# Patient Record
Sex: MALE | Race: WHITE | NOT HISPANIC OR LATINO | Employment: FULL TIME | URBAN - METROPOLITAN AREA
[De-identification: names, ages, dates, MRNs, and addresses within clinical notes are randomized per-mention and may not be internally consistent; named-entity substitution may affect disease eponyms.]

---

## 2017-04-10 ENCOUNTER — OFFICE VISIT (OUTPATIENT)
Dept: FAMILY MEDICINE | Facility: CLINIC | Age: 42
End: 2017-04-10
Payer: COMMERCIAL

## 2017-04-10 VITALS
SYSTOLIC BLOOD PRESSURE: 134 MMHG | DIASTOLIC BLOOD PRESSURE: 78 MMHG | HEIGHT: 74 IN | BODY MASS INDEX: 32.85 KG/M2 | TEMPERATURE: 97.4 F | OXYGEN SATURATION: 99 % | HEART RATE: 75 BPM | WEIGHT: 256 LBS

## 2017-04-10 DIAGNOSIS — R36.1 HEMATOSPERMIA: ICD-10-CM

## 2017-04-10 DIAGNOSIS — Z12.5 SCREENING FOR PROSTATE CANCER: ICD-10-CM

## 2017-04-10 DIAGNOSIS — Z00.00 HEALTH CARE MAINTENANCE: Primary | ICD-10-CM

## 2017-04-10 PROCEDURE — 99396 PREV VISIT EST AGE 40-64: CPT | Performed by: FAMILY MEDICINE

## 2017-04-10 NOTE — NURSING NOTE
"Chief Complaint   Patient presents with     Physical     non fasting       Initial /90 (BP Location: Right arm, Patient Position: Chair, Cuff Size: Adult Regular)  Pulse 75  Temp 97.4  F (36.3  C) (Tympanic)  Ht 6' 1.5\" (1.867 m)  Wt 256 lb (116.1 kg)  SpO2 99%  BMI 33.32 kg/m2 Estimated body mass index is 33.32 kg/(m^2) as calculated from the following:    Height as of this encounter: 6' 1.5\" (1.867 m).    Weight as of this encounter: 256 lb (116.1 kg).  Medication Reconciliation: complete  "

## 2017-04-10 NOTE — PROGRESS NOTES
SUBJECTIVE:     CC: Rocael Borden is an 42 year old male who presents for preventative health visit.     Healthy Habits:    Do you get at least three servings of calcium containing foods daily (dairy, green leafy vegetables, etc.)? yes    Amount of exercise or daily activities, outside of work: 2-3 day(s) per week    Problems taking medications regularly not applicable    Medication side effects: No    Have you had an eye exam in the past two years? no    Do you see a dentist twice per year? no    Do you have sleep apnea, excessive snoring or daytime drowsiness?no        PROBLEMS TO ADD ON...na     Today's PHQ-2 Score:   PHQ-2 ( 1999 Pf[employer]) 8/23/2016 1/11/2016   Q1: Little interest or pleasure in doing things 0 0   Q2: Feeling down, depressed or hopeless 0 0   PHQ-2 Score 0 0   Little interest or pleasure in doing things - -   Feeling down, depressed or hopeless - -   PHQ-2 Score - -       Abuse: Current or Past(Physical, Sexual or Emotional)- NO  Do you feel safe in your environment - YES    Social History   Substance Use Topics     Smoking status: Never Smoker     Smokeless tobacco: Current User     Types: Chew     Alcohol use 0.0 oz/week     0 Standard drinks or equivalent per week      Comment: 3 drinks twice per week     The patient does not drink >3 drinks per day nor >7 drinks per week.    Last PSA: No results found for: PSA    Recent Labs   Lab Test  08/23/16   1538  08/21/15   1606  03/14/12   1104   CHOL  209*  189  224*   HDL  54  48  57   LDL  125*  107  147*   TRIG  152*  168*  99   CHOLHDLRATIO   --   3.9  3.9   NHDL  155*   --    --        Reviewed orders with patient. Reviewed health maintenance and updated orders accordingly - Yes    Reviewed and updated as needed this visit by clinical staff         Reviewed and updated as needed this visit by Provider        Past Medical History:   Diagnosis Date     Erectile dysfunction, unspecified erectile dysfunction type 1/11/2016     NO ACTIVE PROBLEMS        Past Surgical History:   Procedure Laterality Date     NO HISTORY OF SURGERY         ROS:  C: NEGATIVE for fever, chills, change in weight  I: NEGATIVE for worrisome rashes, moles or lesions  E: NEGATIVE for vision changes or irritation  ENT: NEGATIVE for ear, mouth and throat problems  R: NEGATIVE for significant cough or SOB  CV: NEGATIVE for chest pain, palpitations or peripheral edema  GI: NEGATIVE for nausea, abdominal pain, heartburn, or change in bowel habits   male: negative for dysuria, hematuria, decreased urinary stream, erectile dysfunction, urethral discharge  M: NEGATIVE for significant arthralgias or myalgia  N: NEGATIVE for weakness, dizziness or paresthesias  P: NEGATIVE for changes in mood or affect    BP Readings from Last 3 Encounters:   04/10/17 134/78   08/23/16 122/80   01/11/16 112/78    Wt Readings from Last 3 Encounters:   04/10/17 256 lb (116.1 kg)   08/23/16 248 lb (112.5 kg)   01/11/16 249 lb (112.9 kg)                  Patient Active Problem List   Diagnosis     Skin Tags     Hyperlipidemia LDL goal <160     Obesity (BMI 30-39.9)     Lentigo     Knee pain     Erectile dysfunction, unspecified erectile dysfunction type     Past Surgical History:   Procedure Laterality Date     NO HISTORY OF SURGERY         Social History   Substance Use Topics     Smoking status: Never Smoker     Smokeless tobacco: Current User     Types: Chew     Alcohol use 0.0 oz/week     0 Standard drinks or equivalent per week      Comment: 3 drinks twice per week     Family History   Problem Relation Age of Onset     DIABETES Maternal Grandmother      C.A.D. No family hx of      Prostate Cancer No family hx of      Cancer - colorectal No family hx of          Current Outpatient Prescriptions   Medication Sig Dispense Refill     tadalafil (CIALIS) 5 MG tablet Take 1 tablet (5 mg) by mouth daily Never use with nitroglycerin, terazosin or doxazosin. (Patient not taking: Reported on 4/10/2017) 30 tablet 2  "    Allergies   Allergen Reactions     Penicillins Swelling     Recent Labs   Lab Test  08/23/16   1538  08/21/15   1606  03/14/12   1104  06/25/10   0836   LDL  125*  107  147*  122   HDL  54  48  57  51   TRIG  152*  168*  99  154*   ALT   --   55   --   42   CR  0.82  0.86   --   0.91   GFRESTIMATED  >90  Non  GFR Calc    >90  Non  GFR Calc     --   >90   GFRESTBLACK  >90   GFR Calc    >90   GFR Calc     --   >90   POTASSIUM  4.0  3.9   --   4.3   TSH   --    --   1.99   --       OBJECTIVE:     /78 (BP Location: Right arm, Patient Position: Chair, Cuff Size: Adult Regular)  Pulse 75  Temp 97.4  F (36.3  C) (Tympanic)  Ht 6' 1.5\" (1.867 m)  Wt 256 lb (116.1 kg)  SpO2 99%  BMI 33.32 kg/m2  EXAM:  GENERAL: healthy, alert and no distress  EYES: Eyes grossly normal to inspection, PERRL and conjunctivae and sclerae normal  HENT: ear canals and TM's normal, nose and mouth without ulcers or lesions  NECK: no adenopathy, no asymmetry, masses, or scars and thyroid normal to palpation  RESP: lungs clear to auscultation - no rales, rhonchi or wheezes  CV: regular rate and rhythm, normal S1 S2, no S3 or S4, no murmur, click or rub, no peripheral edema and peripheral pulses strong  ABDOMEN: soft, nontender, no hepatosplenomegaly, no masses and bowel sounds normal   (male): normal male genitalia without lesions or urethral discharge, no hernia  MS: no gross musculoskeletal defects noted, no edema  SKIN: no suspicious lesions or rashes  NEURO: Normal strength and tone, mentation intact and speech normal  BACK: no CVA tenderness, no paralumbar tenderness  PSYCH: mentation appears normal, affect normal/bright  LYMPH: no cervical, supraclavicular, axillary, or inguinal adenopathy    ASSESSMENT/PLAN:     1. Health care maintenance    - Hemoglobin; Future  - Basic metabolic panel  (Ca, Cl, CO2, Creat, Gluc, K, Na, BUN); Future  - ALT; Future  - Lipid " "Profile with reflex to direct LDL; Future  - PSA, screen; Future  - UA with Microscopic reflex to Culture; Future    2. Hematospermia  Has no other clinical sign, will have him to do lab and referral to urology for further evaluation   - PSA, screen; Future  - UA with Microscopic reflex to Culture; Future  - UROLOGY ADULT REFERRAL    3. Screening for prostate cancer    - PSA, screen; Future  - UA with Microscopic reflex to Culture; Future  - UROLOGY ADULT REFERRAL    COUNSELING:  Reviewed preventive health counseling, as reflected in patient instructions         reports that he has never smoked. His smokeless tobacco use includes Chew.    Estimated body mass index is 32.28 kg/(m^2) as calculated from the following:    Height as of 8/23/16: 6' 1.5\" (1.867 m).    Weight as of 8/23/16: 248 lb (112.5 kg).       Counseling Resources:  ATP IV Guidelines  Pooled Cohorts Equation Calculator  FRAX Risk Assessment  ICSI Preventive Guidelines  Dietary Guidelines for Americans, 2010  USDA's MyPlate  ASA Prophylaxis  Lung CA Screening    Aaron Lynne MD  [facility]  "

## 2017-04-10 NOTE — MR AVS SNAPSHOT
After Visit Summary   4/10/2017    Rocael Borden    MRN: [id number]           Patient Information     Date Of Birth          1975        Visit Information        Provider Department      4/10/2017 2:20 PM Aaron Lynne MD [facility]        Today's Diagnoses     Health care maintenance    -  1    Hematospermia        Screening for prostate cancer          Care Instructions      Preventive Health Recommendations  Male Ages 40 to 49    Yearly exam:             See your health care provider every year in order to  o   Review health changes.   o   Discuss preventive care.    o   Review your medicines if your doctor has prescribed any.    You should be tested each year for STDs (sexually transmitted diseases) if you re at risk.     Have a cholesterol test every 5 years.     Have a colonoscopy (test for colon cancer) if someone in your family has had colon cancer or polyps before age 50.     After age 45, have a diabetes test (fasting glucose). If you are at risk for diabetes, you should have this test every 3 years.      Talk with your health care provider about whether or not a prostate cancer screening test (PSA) is right for you.    Shots: Get a flu shot each year. Get a tetanus shot every 10 years.     Nutrition:    Eat at least 5 servings of fruits and vegetables daily.     Eat whole-grain bread, whole-wheat pasta and brown rice instead of white grains and rice.     Talk to your provider about Calcium and Vitamin D.     Lifestyle    Exercise for at least 150 minutes a week (30 minutes a day, 5 days a week). This will help you control your weight and prevent disease.     Limit alcohol to one drink per day.     No smoking.     Wear sunscreen to prevent skin cancer.     See your dentist every six months for an exam and cleaning.      Preventive Health Recommendations  Male Ages 40 to 49    Yearly exam:             See your health care provider every year in order to  o   Review health  changes.   o   Discuss preventive care.    o   Review your medicines if your doctor has prescribed any.    You should be tested each year for STDs (sexually transmitted diseases) if you re at risk.     Have a cholesterol test every 5 years.     Have a colonoscopy (test for colon cancer) if someone in your family has had colon cancer or polyps before age 50.     After age 45, have a diabetes test (fasting glucose). If you are at risk for diabetes, you should have this test every 3 years.      Talk with your health care provider about whether or not a prostate cancer screening test (PSA) is right for you.    Shots: Get a flu shot each year. Get a tetanus shot every 10 years.     Nutrition:    Eat at least 5 servings of fruits and vegetables daily.     Eat whole-grain bread, whole-wheat pasta and brown rice instead of white grains and rice.     Talk to your provider about Calcium and Vitamin D.     Lifestyle    Exercise for at least 150 minutes a week (30 minutes a day, 5 days a week). This will help you control your weight and prevent disease.     Limit alcohol to one drink per day.     No smoking.     Wear sunscreen to prevent skin cancer.     See your dentist every six months for an exam and cleaning.            Follow-ups after your visit        Additional Services     UROLOGY ADULT REFERRAL       Your provider has referred you to: [facility]: [facility] [phone]   [email or web address]    Please be aware that coverage of these services is subject to the terms and limitations of your health insurance plan.  Call member services at your health plan with any benefit or coverage questions.      Please bring the following with you to your appointment:    (1) Any X-Rays, CTs or MRIs which have been performed.  Contact the facility where they were done to arrange for  prior to your scheduled appointment.    (2) List of current medications  (3) This referral request   (4) Any  documents/labs given to you for this referral                  Your next 10 appointments already scheduled     Apr 11, 2017  8:00 AM CDT   LAB with EC LAB   [facility] [place] [facility]    [address] [place], [place] [address] [place] [address]   [phone]           OUTSIDE LABS:  Please include name of facility and Physician that is requesting outside labs be drawn.  Please indicate if labs are fasting or non-fasting on appt notes.  Be as specific as you can on which labs are being drawn.              Future tests that were ordered for you today     Open Future Orders        Priority Expected Expires Ordered    Hemoglobin Routine  4/10/2018 4/10/2017    Basic metabolic panel  (Ca, Cl, CO2, Creat, Gluc, K, Na, BUN) Routine  4/10/2018 4/10/2017    ALT Routine  4/10/2018 4/10/2017    Lipid Profile with reflex to direct LDL Routine  4/10/2018 4/10/2017    PSA, screen Routine  4/10/2018 4/10/2017    UA with Microscopic reflex to Culture Routine  4/10/2018 4/10/2017            Who to contact     If you have questions or need follow up information about today's clinic visit or your schedule please contact [facility] directly at [phone].  Normal or non-critical lab and imaging results will be communicated to you by GreenLighthart, letter or phone within 4 business days after the clinic has received the results. If you do not hear from us within 7 days, please contact the clinic through GreenLighthart or phone. If you have a critical or abnormal lab result, we will notify you by phone as soon as possible.  Submit refill requests through Modabound or call your pharmacy and they will forward the refill request to us. Please allow 3 business days for your refill to be completed.          Additional Information About Your Visit        Modabound Information     Modabound gives you secure access to your electronic health record. If you see a primary care provider, you can also send messages to  "your care team and make appointments. If you have questions, please call your primary care clinic.  If you do not have a primary care provider, please call [phone] and they will assist you.        Care EveryWhere ID     This is your Care EveryWhere ID. This could be used by other organizations to access your [place] medical records  [id number]        Your Vitals Were     Pulse Temperature Height Pulse Oximetry BMI (Body Mass Index)       75 97.4  F (36.3  C) (Tympanic) 6' 1.5\" (1.867 m) 99% 33.32 kg/m2        Blood Pressure from Last 3 Encounters:   04/10/17 134/78   08/23/16 122/80   01/11/16 112/78    Weight from Last 3 Encounters:   04/10/17 256 lb (116.1 kg)   08/23/16 248 lb (112.5 kg)   01/11/16 249 lb (112.9 kg)              We Performed the Following     UROLOGY ADULT REFERRAL        Primary Care Provider Office Phone # Fax #    Aaron Lynne [phone]       [address] [address]        Thank you!     Thank you for choosing [facility]  for your care. Our goal is always to provide you with excellent care. Hearing back from our patients is one way we can continue to improve our services. Please take a few minutes to complete the written survey that you may receive in the mail after your visit with us. Thank you!             Your Updated Medication List - Protect others around you: Learn how to safely use, store and throw away your medicines at www.disposemymeds.org.          This list is accurate as of: 4/10/17  2:40 PM.  Always use your most recent med list.                   Brand Name Dispense Instructions for use    tadalafil 5 MG tablet    CIALIS    30 tablet    Take 1 tablet (5 mg) by mouth daily Never use with nitroglycerin, terazosin or doxazosin.         "

## 2017-04-11 DIAGNOSIS — Z12.5 SCREENING FOR PROSTATE CANCER: ICD-10-CM

## 2017-04-11 DIAGNOSIS — Z00.00 HEALTH CARE MAINTENANCE: ICD-10-CM

## 2017-04-11 DIAGNOSIS — R36.1 HEMATOSPERMIA: ICD-10-CM

## 2017-04-11 LAB
ALBUMIN UR-MCNC: NEGATIVE MG/DL
ALT SERPL W P-5'-P-CCNC: 95 U/L (ref 0–70)
AMORPH CRY #/AREA URNS HPF: ABNORMAL /HPF
ANION GAP SERPL CALCULATED.3IONS-SCNC: 4 MMOL/L (ref 3–14)
APPEARANCE UR: CLEAR
BACTERIA #/AREA URNS HPF: ABNORMAL /HPF
BILIRUB UR QL STRIP: NEGATIVE
BUN SERPL-MCNC: 17 MG/DL (ref 7–30)
CALCIUM SERPL-MCNC: 9.5 MG/DL (ref 8.5–10.1)
CHLORIDE SERPL-SCNC: 106 MMOL/L (ref 94–109)
CHOLEST SERPL-MCNC: 213 MG/DL
CO2 SERPL-SCNC: 30 MMOL/L (ref 20–32)
COLOR UR AUTO: YELLOW
CREAT SERPL-MCNC: 0.85 MG/DL (ref 0.66–1.25)
GFR SERPL CREATININE-BSD FRML MDRD: ABNORMAL ML/MIN/1.7M2
GLUCOSE SERPL-MCNC: 105 MG/DL (ref 70–99)
GLUCOSE UR STRIP-MCNC: NEGATIVE MG/DL
HDLC SERPL-MCNC: 52 MG/DL
HGB BLD-MCNC: 15.5 G/DL (ref 13.3–17.7)
HGB UR QL STRIP: ABNORMAL
KETONES UR STRIP-MCNC: NEGATIVE MG/DL
LDLC SERPL CALC-MCNC: 117 MG/DL
LEUKOCYTE ESTERASE UR QL STRIP: NEGATIVE
MUCOUS THREADS #/AREA URNS LPF: PRESENT /LPF
NITRATE UR QL: NEGATIVE
NON-SQ EPI CELLS #/AREA URNS LPF: ABNORMAL /LPF
NONHDLC SERPL-MCNC: 161 MG/DL
PH UR STRIP: 5.5 PH (ref 5–7)
POTASSIUM SERPL-SCNC: 4.4 MMOL/L (ref 3.4–5.3)
PSA SERPL-ACNC: 1.42 UG/L (ref 0–4)
RBC #/AREA URNS AUTO: ABNORMAL /HPF (ref 0–2)
SODIUM SERPL-SCNC: 140 MMOL/L (ref 133–144)
SP GR UR STRIP: 1.02 (ref 1–1.03)
TRIGL SERPL-MCNC: 221 MG/DL
URN SPEC COLLECT METH UR: ABNORMAL
UROBILINOGEN UR STRIP-ACNC: 0.2 EU/DL (ref 0.2–1)
WBC #/AREA URNS AUTO: ABNORMAL /HPF (ref 0–2)

## 2017-04-11 PROCEDURE — 36415 COLL VENOUS BLD VENIPUNCTURE: CPT | Performed by: FAMILY MEDICINE

## 2017-04-11 PROCEDURE — 85018 HEMOGLOBIN: CPT | Performed by: FAMILY MEDICINE

## 2017-04-11 PROCEDURE — 80048 BASIC METABOLIC PNL TOTAL CA: CPT | Performed by: FAMILY MEDICINE

## 2017-04-11 PROCEDURE — 80061 LIPID PANEL: CPT | Performed by: FAMILY MEDICINE

## 2017-04-11 PROCEDURE — G0103 PSA SCREENING: HCPCS | Performed by: FAMILY MEDICINE

## 2017-04-11 PROCEDURE — 84460 ALANINE AMINO (ALT) (SGPT): CPT | Performed by: FAMILY MEDICINE

## 2017-04-11 PROCEDURE — 81001 URINALYSIS AUTO W/SCOPE: CPT | Performed by: FAMILY MEDICINE

## 2018-04-25 ENCOUNTER — OFFICE VISIT (OUTPATIENT)
Dept: FAMILY MEDICINE | Facility: CLINIC | Age: 43
End: 2018-04-25
Payer: COMMERCIAL

## 2018-04-25 VITALS
TEMPERATURE: 97.3 F | HEART RATE: 77 BPM | HEIGHT: 74 IN | WEIGHT: 255 LBS | OXYGEN SATURATION: 98 % | BODY MASS INDEX: 32.73 KG/M2 | RESPIRATION RATE: 16 BRPM | DIASTOLIC BLOOD PRESSURE: 82 MMHG | SYSTOLIC BLOOD PRESSURE: 121 MMHG

## 2018-04-25 DIAGNOSIS — Z00.00 HEALTH CARE MAINTENANCE: Primary | ICD-10-CM

## 2018-04-25 DIAGNOSIS — Z11.3 SCREEN FOR STD (SEXUALLY TRANSMITTED DISEASE): ICD-10-CM

## 2018-04-25 LAB
ERYTHROCYTE [DISTWIDTH] IN BLOOD BY AUTOMATED COUNT: 13.4 % (ref 10–15)
HCT VFR BLD AUTO: 45.3 % (ref 40–53)
HGB BLD-MCNC: 14.3 G/DL (ref 13.3–17.7)
MCH RBC QN AUTO: 30.1 PG (ref 26.5–33)
MCHC RBC AUTO-ENTMCNC: 31.6 G/DL (ref 31.5–36.5)
MCV RBC AUTO: 95 FL (ref 78–100)
PLATELET # BLD AUTO: 159 10E9/L (ref 150–450)
RBC # BLD AUTO: 4.75 10E12/L (ref 4.4–5.9)
WBC # BLD AUTO: 4 10E9/L (ref 4–11)

## 2018-04-25 PROCEDURE — 80053 COMPREHEN METABOLIC PANEL: CPT | Performed by: FAMILY MEDICINE

## 2018-04-25 PROCEDURE — 87491 CHLMYD TRACH DNA AMP PROBE: CPT | Performed by: FAMILY MEDICINE

## 2018-04-25 PROCEDURE — 99396 PREV VISIT EST AGE 40-64: CPT | Performed by: FAMILY MEDICINE

## 2018-04-25 PROCEDURE — 85027 COMPLETE CBC AUTOMATED: CPT | Performed by: FAMILY MEDICINE

## 2018-04-25 PROCEDURE — 36415 COLL VENOUS BLD VENIPUNCTURE: CPT | Performed by: FAMILY MEDICINE

## 2018-04-25 PROCEDURE — 80061 LIPID PANEL: CPT | Performed by: FAMILY MEDICINE

## 2018-04-25 PROCEDURE — 87591 N.GONORRHOEAE DNA AMP PROB: CPT | Performed by: FAMILY MEDICINE

## 2018-04-25 NOTE — MR AVS SNAPSHOT
After Visit Summary   4/25/2018    Rocael Borden    MRN: [id number]           Patient Information     Date Of Birth          1975        Visit Information        Provider Department      4/25/2018 12:40 PM Aaron Lynne MD [facility] [place]        Today's Diagnoses     Health care maintenance    -  1    Screen for STD (sexually transmitted disease)          Care Instructions      Preventive Health Recommendations  Male Ages 40 to 49    Yearly exam:             See your health care provider every year in order to  o   Review health changes.   o   Discuss preventive care.    o   Review your medicines if your doctor has prescribed any.    You should be tested each year for STDs (sexually transmitted diseases) if you re at risk.     Have a cholesterol test every 5 years.     Have a colonoscopy (test for colon cancer) if someone in your family has had colon cancer or polyps before age 50.     After age 45, have a diabetes test (fasting glucose). If you are at risk for diabetes, you should have this test every 3 years.      Talk with your health care provider about whether or not a prostate cancer screening test (PSA) is right for you.    Shots: Get a flu shot each year. Get a tetanus shot every 10 years.     Nutrition:    Eat at least 5 servings of fruits and vegetables daily.     Eat whole-grain bread, whole-wheat pasta and brown rice instead of white grains and rice.     Talk to your provider about Calcium and Vitamin D.     Lifestyle    Exercise for at least 150 minutes a week (30 minutes a day, 5 days a week). This will help you control your weight and prevent disease.     Limit alcohol to one drink per day.     No smoking.     Wear sunscreen to prevent skin cancer.     See your dentist every six months for an exam and cleaning.              Follow-ups after your visit        Follow-up notes from your care team     Return in about 1 year (around 4/25/2019) for Physical Exam.      Who to  "contact     If you have questions or need follow up information about today's clinic visit or your schedule please contact [facility] directly at [phone].  Normal or non-critical lab and imaging results will be communicated to you by MyChart, letter or phone within 4 business days after the clinic has received the results. If you do not hear from us within 7 days, please contact the clinic through MyChart or phone. If you have a critical or abnormal lab result, we will notify you by phone as soon as possible.  Submit refill requests through Dr. Z or call your pharmacy and they will forward the refill request to us. Please allow 3 business days for your refill to be completed.          Additional Information About Your Visit        ScrollMotionharGoodData Information     Dr. Z gives you secure access to your electronic health record. If you see a primary care provider, you can also send messages to your care team and make appointments. If you have questions, please call your primary care clinic.  If you do not have a primary care provider, please call [phone] and they will assist you.        Care EveryWhere ID     This is your Care EveryWhere ID. This could be used by other organizations to access your [place] medical records  [id number]        Your Vitals Were     Pulse Temperature Respirations Height Pulse Oximetry BMI (Body Mass Index)    77 97.3  F (36.3  C) (Tympanic) 16 6' 1.75\" (1.873 m) 98% 32.96 kg/m2       Blood Pressure from Last 3 Encounters:   04/25/18 121/82   04/10/17 134/78   08/23/16 122/80    Weight from Last 3 Encounters:   04/25/18 255 lb (115.7 kg)   04/10/17 256 lb (116.1 kg)   08/23/16 248 lb (112.5 kg)              We Performed the Following     CBC with platelets     Chlamydia trachomatis PCR     Comprehensive metabolic panel     Lipid panel reflex to direct LDL Fasting     Neisseria gonorrhoeae PCR        Primary Care Provider Office Phone # Fax #    Aaron Lynne MD " [phone]       [address] [facility] [address]        Equal Access to Services     ARAM KAUR : Ozzie Meyer, vasupete turpin, florenciajose cuevasmarinoda evgeny, debbie perryjose alejandro morgan castillo laPaulinaeric brito. So [facility] [phone].    ATENCIÓN: Si habla español, tiene a gray disposición servicios gratuitos de asistencia lingüística. Llame al [phone].    We comply with applicable federal civil rights laws and [place] laws. We do not discriminate on the basis of race, color, national origin, age, disability, sex, sexual orientation, or gender identity.            Thank you!     Thank you for choosing [facility]  [place] your [place]. Our goal is always to provide you with excellent care. Hearing back from our patients is one way we can continue to improve our services. Please take a few minutes to complete the written survey that you may receive in the mail after your visit with us. Thank you!             Your Updated Medication List - Protect others around you: Learn how to safely use, store and throw away your medicines at www.disposemymeds.org.      Notice  As of 4/25/2018  1:33 PM    You have not been prescribed any medications.

## 2018-04-25 NOTE — PROGRESS NOTES
SUBJECTIVE:   CC: Rocael Borden is an 43 year old male who presents for preventative health visit.     Healthy Habits:    Do you get at least three servings of calcium containing foods daily (dairy, green leafy vegetables, etc.)? yes    Amount of exercise or daily activities, outside of work: 0    Problems taking medications regularly No    Medication side effects: No    Have you had an eye exam in the past two years? no    Do you see a dentist twice per year? yes    Do you have sleep apnea, excessive snoring or daytime drowsiness?no     Today's PHQ-2 Score:   PHQ-2 ( 1999 [employer]) 8/23/2016 1/11/2016   Q1: Little interest or pleasure in doing things 0 0   Q2: Feeling down, depressed or hopeless 0 0   PHQ-2 Score 0 0   Q1: Little interest or pleasure in doing things - -   Q2: Feeling down, depressed or hopeless - -   PHQ-2 Score - -       Abuse: Current or Past(Physical, Sexual or Emotional)- No  Do you feel safe in your environment - Yes    Social History   Substance Use Topics     Smoking status: Never Smoker     Smokeless tobacco: Current User     Types: Chew     Alcohol use 0.0 oz/week     0 Standard drinks or equivalent per week      Comment: 3 drinks twice per week      If you drink alcohol do you typically have >3 drinks per day or >7 drinks per week? No                      Last PSA:   PSA   Date Value Ref Range Status   04/11/2017 1.42 0 - 4 ug/L Final     Comment:     Assay Method:  Chemiluminescence using Siemens Vista analyzer       Reviewed orders with patient. Reviewed health maintenance and updated orders accordingly - Yes  BP Readings from Last 3 Encounters:   04/25/18 121/82   04/10/17 134/78   08/23/16 122/80    Wt Readings from Last 3 Encounters:   04/25/18 255 lb (115.7 kg)   04/10/17 256 lb (116.1 kg)   08/23/16 248 lb (112.5 kg)                  Patient Active Problem List   Diagnosis     Skin Tags     Hyperlipidemia LDL goal <160     Obesity (BMI 30-39.9)     Lentigo     Knee pain      Erectile dysfunction, unspecified erectile dysfunction type     Past Surgical History:   Procedure Laterality Date     NO HISTORY OF SURGERY         Social History   Substance Use Topics     Smoking status: Never Smoker     Smokeless tobacco: Current User     Types: Chew     Alcohol use 0.0 oz/week     0 Standard drinks or equivalent per week      Comment: 3 drinks twice per week     Family History   Problem Relation Age of Onset     DIABETES Maternal Grandmother      C.A.D. No family hx of      Prostate Cancer No family hx of      Cancer - colorectal No family hx of          No current outpatient prescriptions on file.     Allergies   Allergen Reactions     Penicillins Swelling     Recent Labs   Lab Test  04/11/17   0816  08/23/16   1538  08/21/15   1606  03/14/12   1104  06/25/10   0836   LDL  117*  125*  107  147*  122   HDL  52  54  48  57  51   TRIG  221*  152*  168*  99  154*   ALT  95*   --   55   --   42   CR  0.85  0.82  0.86   --   0.91   GFRESTIMATED  >90  Non  GFR Calc    >90  Non  GFR Calc    >90  Non  GFR Calc     --   >90   GFRESTBLACK  >90   GFR Calc    >90   GFR Calc    >90   GFR Calc     --   >90   POTASSIUM  4.4  4.0  3.9   --   4.3   TSH   --    --    --   1.99   --         Reviewed and updated as needed this visit by clinical staff         Reviewed and updated as needed this visit by Provider        Past Medical History:   Diagnosis Date     Erectile dysfunction, unspecified erectile dysfunction type 1/11/2016     NO ACTIVE PROBLEMS       Past Surgical History:   Procedure Laterality Date     NO HISTORY OF SURGERY         ROS:  C: NEGATIVE for fever, chills, change in weight  I: NEGATIVE for worrisome rashes, moles or lesions  E: NEGATIVE for vision changes or irritation  ENT: NEGATIVE for ear, mouth and throat problems  R: NEGATIVE for significant cough or SOB  CV: NEGATIVE for chest pain,  "palpitations or peripheral edema  GI: NEGATIVE for nausea, abdominal pain, heartburn, or change in bowel habits   male: negative for dysuria, hematuria, decreased urinary stream, erectile dysfunction, urethral discharge  M: NEGATIVE for significant arthralgias or myalgia  N: NEGATIVE for weakness, dizziness or paresthesias  P: NEGATIVE for changes in mood or affect    OBJECTIVE:   /82 (Cuff Size: Adult Large)  Pulse 77  Temp 97.3  F (36.3  C) (Tympanic)  Resp 16  Ht 6' 1.75\" (1.873 m)  Wt 255 lb (115.7 kg)  SpO2 98%  BMI 32.96 kg/m2  EXAM:  GENERAL: healthy, alert and no distress  EYES: Eyes grossly normal to inspection, PERRL and conjunctivae and sclerae normal  HENT: ear canals and TM's normal, nose and mouth without ulcers or lesions  NECK: no adenopathy, no asymmetry, masses, or scars and thyroid normal to palpation  RESP: lungs clear to auscultation - no rales, rhonchi or wheezes  CV: regular rate and rhythm, normal S1 S2, no S3 or S4, no murmur, click or rub, no peripheral edema and peripheral pulses strong  ABDOMEN: soft, nontender, no hepatosplenomegaly, no masses and bowel sounds normal  MS: no gross musculoskeletal defects noted, no edema  SKIN: no suspicious lesions or rashes  NEURO: Normal strength and tone, mentation intact and speech normal  BACK: no CVA tenderness, no paralumbar tenderness  PSYCH: mentation appears normal, affect normal/bright  LYMPH: no cervical, supraclavicular, axillary, or inguinal adenopathy    ASSESSMENT/PLAN:       ICD-10-CM    1. Health care maintenance Z00.00 CBC with platelets     Comprehensive metabolic panel     Lipid panel reflex to direct LDL Fasting   2. Screen for STD (sexually transmitted disease) Z11.3 Neisseria gonorrhoeae PCR     Chlamydia trachomatis PCR       COUNSELING:  Reviewed preventive health counseling, as reflected in patient instructions       reports that he has never smoked. His smokeless tobacco use includes Chew.    Estimated body mass " "index is 33.32 kg/(m^2) as calculated from the following:    Height as of 4/10/17: 6' 1.5\" (1.867 m).    Weight as of 4/10/17: 256 lb (116.1 kg).       Counseling Resources:  ATP IV Guidelines  Pooled Cohorts Equation Calculator  FRAX Risk Assessment  ICSI Preventive Guidelines  Dietary Guidelines for Americans, 2010  USDA's MyPlate  ASA Prophylaxis  Lung CA Screening    Aaron Lynne MD  [facility] [place]  "

## 2018-04-25 NOTE — NURSING NOTE
"Chief Complaint   Patient presents with     Physical       Initial /82 (Cuff Size: Adult Large)  Pulse 77  Temp 97.3  F (36.3  C) (Tympanic)  Resp 16  Ht 6' 1.75\" (1.873 m)  Wt 255 lb (115.7 kg)  SpO2 98%  BMI 32.96 kg/m2 Estimated body mass index is 32.96 kg/(m^2) as calculated from the following:    Height as of this encounter: 6' 1.75\" (1.873 m).    Weight as of this encounter: 255 lb (115.7 kg).  Medication Reconciliation: complete   Karen Dent, CMA    "

## 2018-04-26 LAB
ALBUMIN SERPL-MCNC: 4.1 G/DL (ref 3.4–5)
ALP SERPL-CCNC: 76 U/L (ref 40–150)
ALT SERPL W P-5'-P-CCNC: 114 U/L (ref 0–70)
ANION GAP SERPL CALCULATED.3IONS-SCNC: 7 MMOL/L (ref 3–14)
AST SERPL W P-5'-P-CCNC: 53 U/L (ref 0–45)
BILIRUB SERPL-MCNC: 1.1 MG/DL (ref 0.2–1.3)
BUN SERPL-MCNC: 11 MG/DL (ref 7–30)
CALCIUM SERPL-MCNC: 9.3 MG/DL (ref 8.5–10.1)
CHLORIDE SERPL-SCNC: 108 MMOL/L (ref 94–109)
CHOLEST SERPL-MCNC: 182 MG/DL
CO2 SERPL-SCNC: 27 MMOL/L (ref 20–32)
CREAT SERPL-MCNC: 0.77 MG/DL (ref 0.66–1.25)
GFR SERPL CREATININE-BSD FRML MDRD: >90 ML/MIN/1.7M2
GLUCOSE SERPL-MCNC: 83 MG/DL (ref 70–99)
HDLC SERPL-MCNC: 47 MG/DL
LDLC SERPL CALC-MCNC: 102 MG/DL
NONHDLC SERPL-MCNC: 135 MG/DL
POTASSIUM SERPL-SCNC: 4.4 MMOL/L (ref 3.4–5.3)
PROT SERPL-MCNC: 7.3 G/DL (ref 6.8–8.8)
SODIUM SERPL-SCNC: 142 MMOL/L (ref 133–144)
TRIGL SERPL-MCNC: 166 MG/DL

## 2018-04-27 LAB
C TRACH DNA SPEC QL NAA+PROBE: NEGATIVE
N GONORRHOEA DNA SPEC QL NAA+PROBE: NEGATIVE
SPECIMEN SOURCE: NORMAL
SPECIMEN SOURCE: NORMAL

## 2018-06-27 ENCOUNTER — OFFICE VISIT (OUTPATIENT)
Dept: FAMILY MEDICINE | Facility: CLINIC | Age: 43
End: 2018-06-27
Payer: COMMERCIAL

## 2018-06-27 VITALS
BODY MASS INDEX: 32.6 KG/M2 | RESPIRATION RATE: 16 BRPM | SYSTOLIC BLOOD PRESSURE: 133 MMHG | TEMPERATURE: 97.4 F | WEIGHT: 254 LBS | HEART RATE: 83 BPM | HEIGHT: 74 IN | OXYGEN SATURATION: 98 % | DIASTOLIC BLOOD PRESSURE: 78 MMHG

## 2018-06-27 DIAGNOSIS — S49.91XA SHOULDER INJURY, RIGHT, INITIAL ENCOUNTER: ICD-10-CM

## 2018-06-27 DIAGNOSIS — H65.192 OTHER ACUTE NONSUPPURATIVE OTITIS MEDIA OF LEFT EAR, RECURRENCE NOT SPECIFIED: Primary | ICD-10-CM

## 2018-06-27 PROCEDURE — 99214 OFFICE O/P EST MOD 30 MIN: CPT | Performed by: FAMILY MEDICINE

## 2018-06-27 RX ORDER — AZITHROMYCIN 250 MG/1
TABLET, FILM COATED ORAL
Qty: 6 TABLET | Refills: 0 | Status: SHIPPED | OUTPATIENT
Start: 2018-06-27 | End: 2020-08-20

## 2018-06-27 NOTE — PROGRESS NOTES
SUBJECTIVE:   Rocael Borden is a 43 year old male who presents to clinic today for the following health issues:      Musculoskeletal problem/pain      Duration: 6 months     Description  Location: bilateral shoulder pain     Intensity:  mild    Accompanying signs and symptoms: none    History  Previous similar problem: left shoulder injury while playing football   Previous evaluation:  none    Precipitating or alleviating factors:  Trauma or overuse: working on his house, physical work   Aggravating factors include: movement, lifting, physical work     Therapies tried and outcome: nothing      Ear Pain      Duration: 2 days     Description (location/character/radiation): left ear pain     Accompanying signs and symptoms: facial pain          Problem list and histories reviewed & adjusted, as indicated.  Additional history: as documented    Patient Active Problem List   Diagnosis     Skin Tags     Hyperlipidemia LDL goal <160     Obesity (BMI 30-39.9)     Lentigo     Knee pain     Erectile dysfunction, unspecified erectile dysfunction type     Past Surgical History:   Procedure Laterality Date     NO HISTORY OF SURGERY         Social History   Substance Use Topics     Smoking status: Never Smoker     Smokeless tobacco: Current User     Types: Chew     Alcohol use 0.0 oz/week     0 Standard drinks or equivalent per week      Comment: 3 drinks twice per week     Family History   Problem Relation Age of Onset     Diabetes Maternal Grandmother      C.A.D. No family hx of      Prostate Cancer No family hx of      Cancer - colorectal No family hx of          Current Outpatient Prescriptions   Medication Sig Dispense Refill     azithromycin (ZITHROMAX) 250 MG tablet Two tablets first day, then one tablet daily for four days. 6 tablet 0     Allergies   Allergen Reactions     Penicillins Swelling     Recent Labs   Lab Test  04/25/18   1316  04/11/17   0816  08/23/16   1538  08/21/15   1606  03/14/12   1104   LDL  102*  117*   "125*  107  147*   HDL  47  52  54  48  57   TRIG  166*  221*  152*  168*  99   ALT  114*  95*   --   55   --    CR  0.77  0.85  0.82  0.86   --    GFRESTIMATED  >90  >90  Non African American GFR Calc    >90  Non  GFR Calc    >90  Non  GFR Calc     --    GFRESTBLACK  >90  >90  African American GFR Calc    >90   GFR Calc    >90   GFR Calc     --    POTASSIUM  4.4  4.4  4.0  3.9   --    TSH   --    --    --    --   1.99      BP Readings from Last 3 Encounters:   06/27/18 133/78   04/25/18 121/82   04/10/17 134/78    Wt Readings from Last 3 Encounters:   06/27/18 254 lb (115.2 kg)   04/25/18 255 lb (115.7 kg)   04/10/17 256 lb (116.1 kg)                    Reviewed and updated as needed this visit by clinical staff       Reviewed and updated as needed this visit by Provider         ROS:  Constitutional, HEENT, cardiovascular, pulmonary, gi and gu systems are negative, except as otherwise noted.    OBJECTIVE:     /78 (Cuff Size: Adult Large)  Pulse 83  Temp 97.4  F (36.3  C) (Tympanic)  Resp 16  Ht 6' 1.75\" (1.873 m)  Wt 254 lb (115.2 kg)  SpO2 98%  BMI 32.83 kg/m2  Body mass index is 32.83 kg/(m^2).  GENERAL: healthy, alert and no distress  NECK: no adenopathy, no asymmetry, masses, or scars and thyroid normal to palpation  HENT: left TM hyperemic and retracted, distorted light reflex   RESP: lungs clear to auscultation - no rales, rhonchi or wheezes  CV: regular rate and rhythm, normal S1 S2, no S3 or S4, no murmur, click or rub, no peripheral edema and peripheral pulses strong  ABDOMEN: soft, nontender, no hepatosplenomegaly, no masses and bowel sounds normal  MS: positive Dowling and empty can sign, tenderness lateral and superior shoulder, positive rotator cuff sign,        ASSESSMENT/PLAN:   ASSESSMENT / PLAN:  (H65.192) Other acute nonsuppurative otitis media of left ear, recurrence not specified  (primary encounter diagnosis)  Comment: " has finding mentioned above, will have him to start abx  Plan: azithromycin (ZITHROMAX) 250 MG tablet            (S49.91XA) Shoulder injury, right, initial encounter  Comment: has been having pain for last 1 year and aggravating with physical activity, has no radiculopathy, has finding mentioned above , will have him to check MR for further evaluation on rotator cuff injury/impingement syndrome and labral tear   Plan: MR Shoulder Right w/o Contrast                FUTURE APPOINTMENTS:       - Follow-up visit after MR done     Aaron Lynne MD  [facility]

## 2018-06-27 NOTE — MR AVS SNAPSHOT
After Visit Summary   6/27/2018    Rocael Borden    MRN: [id number]           Patient Information     Date Of Birth          1975        Visit Information        Provider Department      6/27/2018 7:00 AM Aaron Lynne MD [facility]        Today's Diagnoses     Other acute nonsuppurative otitis media of left ear, recurrence not specified    -  1    Shoulder injury, right, initial encounter           Follow-ups after your visit        Follow-up notes from your care team     Return in about 3 weeks (around 7/18/2018) for shoulder injury .      Future tests that were ordered for you today     Open Future Orders        Priority Expected Expires Ordered    MR Shoulder Right w/o Contrast Routine  6/27/2019 6/27/2018            Who to contact     If you have questions or need follow up information about today's clinic visit or your schedule please contact [facility] directly at [phone].  Normal or non-critical lab and imaging results will be communicated to you by [employer], letter or phone within 4 business days after the clinic has received the results. If you do not hear from us within 7 days, please contact the clinic through [employer] or phone. If you have a critical or abnormal lab result, we will notify you by phone as soon as possible.  Submit refill requests through [employer] or call your pharmacy and they will forward the refill request to us. Please allow 3 business days for your refill to be completed.          Additional Information About Your Visit        MyChart Information     [employer] gives you secure access to your electronic health record. If you see a primary care provider, you can also send messages to your care team and make appointments. If you have questions, please call your primary care clinic.  If you do not have a primary care provider, please call [phone] and they will assist you.        Care EveryWhere ID     This is your Care  "EveryWhere ID. This could be used by other organizations to access your [facility] medical records  [id number]        Your Vitals Were     Pulse Temperature Respirations Height Pulse Oximetry BMI (Body Mass Index)    83 97.4  F (36.3  C) (Tympanic) 16 6' 1.75\" (1.873 m) 98% 32.83 kg/m2       Blood Pressure from Last 3 Encounters:   06/27/18 133/78   04/25/18 121/82   04/10/17 134/78    Weight from Last 3 Encounters:   06/27/18 254 lb (115.2 kg)   04/25/18 255 lb (115.7 kg)   04/10/17 256 lb (116.1 kg)                 Today's Medication Changes          These changes are accurate as of 6/27/18  7:24 AM.  If you have any questions, ask your nurse or doctor.               Start taking these medicines.        Dose/Directions    azithromycin 250 MG tablet   Commonly known as:  ZITHROMAX   Used for:  Other acute nonsuppurative otitis media of left ear, recurrence not specified   Started by:  Aaron Lynne MD        Two tablets first day, then one tablet daily for four days.   Quantity:  6 tablet   Refills:  0            Where to get your medicines      These medications were sent to [id number] - [place], [place] - [address] [facility] [place] [address]  [address], [place] [address]     Phone:  [phone]     azithromycin 250 MG tablet                Primary Care Provider Office Phone # Fax #    Aaron Lynne [phone]       [address] [facility] [address]        Equal Access to Services     [facility] : Hadii varinder ku hadasho Soomaali, waaxda luqadaha, qaybta kaalmada adeegyapete, debbie brito. So [facility] [phone].    ATENCIÓN: Si habla español, tiene a gray disposición servicios gratuitos de asistencia lingüística. Llame al [phone].    We comply with applicable federal civil rights laws and [place] laws. We do not discriminate on the basis of race, color, national origin, age, disability, sex, sexual orientation, " or gender identity.            Thank you!     Thank you for choosing [facility] [place]  for your care. Our goal is always to provide you with excellent care. Hearing back from our patients is one way we can continue to improve our services. Please take a few minutes to complete the written survey that you may receive in the mail after your visit with us. Thank you!             Your Updated Medication List - Protect others around you: Learn how to safely use, store and throw away your medicines at www.disposemymeds.org.          This list is accurate as of 6/27/18  7:24 AM.  Always use your most recent med list.                   Brand Name Dispense Instructions for use Diagnosis    azithromycin 250 MG tablet    ZITHROMAX    6 tablet    Two tablets first day, then one tablet daily for four days.    Other acute nonsuppurative otitis media of left ear, recurrence not specified

## 2018-07-06 ENCOUNTER — TELEPHONE (OUTPATIENT)
Dept: FAMILY MEDICINE | Facility: CLINIC | Age: 43
End: 2018-07-06

## 2018-07-06 NOTE — TELEPHONE ENCOUNTER
Guillermina from [facility] states that she has contacted the patient's insurance, [employer] twice to get the patient's CT approved. Patient was scheduled for today and had to be cancelled and rescheduled for Monday.    Tiago Worley told Guillermina that they could do a provider to provider call to expedite the approval. Please call [phone]. Listen for prompt that states Health Care provider requested to call.   Nhung Espinal RN

## 2018-07-06 NOTE — TELEPHONE ENCOUNTER
Mila from [employer] called back to get an update. She asked if we could call them back once this has been done.     PH: [phone]  Ok to leave message    Beatriz Iglesias  Patient Representative - [facility]

## 2018-07-09 NOTE — TELEPHONE ENCOUNTER
I would wait the approval for this routine MR. He wasn't involved acute accident or traumatic event. Routine elective approval process for MR is fine.      Thank you,

## 2018-07-09 NOTE — TELEPHONE ENCOUNTER
Mila called from [facility] again to check if a peer to peer call had been made otherwise the CT will need to be rescheduled. The phone number to call is Please call [phone]. Listen for prompt that states Health Care provider requested to call.   TC please call Mila at [phone] the peer to peer has been done.  Carmen Echavarria,

## 2018-07-09 NOTE — TELEPHONE ENCOUNTER
Mila was not available and the message below was given Valery at [facility]  Carmen Echavarria,

## 2018-07-24 ENCOUNTER — THERAPY VISIT (OUTPATIENT)
Dept: PHYSICAL THERAPY | Facility: CLINIC | Age: 43
End: 2018-07-24
Payer: COMMERCIAL

## 2018-07-24 DIAGNOSIS — M75.41 IMPINGEMENT SYNDROME, SHOULDER, RIGHT: ICD-10-CM

## 2018-07-24 DIAGNOSIS — M25.511 ACUTE PAIN OF RIGHT SHOULDER: Primary | ICD-10-CM

## 2018-07-24 PROCEDURE — 97110 THERAPEUTIC EXERCISES: CPT | Mod: GP

## 2018-07-24 PROCEDURE — 97161 PT EVAL LOW COMPLEX 20 MIN: CPT | Mod: GP

## 2018-07-24 NOTE — PROGRESS NOTES
[facility] Initial Evaluation  Subjective:  Patient is a 43 year old male presenting with rehab right shoulder hpi.   Rocael Borden is a 43 year old male with a right shoulder condition.  Condition occurred with:  Other.  Condition occurred: other.  This is a new condition  Onset: October 2017 - from remodeling project at home; CC: pain with overhead and reaching.    Patient reports pain:  In the joint.     and is intermittent and reported as 2/10.   Pain is worse in the A.M..  Symptoms are exacerbated by certain positions and lying on extremity and relieved by rest (avoidance ).  Since onset symptoms are unchanged.                                                      Objective:  System                   Shoulder Evaluation:  ROM:  AROM:    Flexion:  Left:  160    Right:  150    Abduction:  Left: 110   Right:  100    Internal Rotation:  Left:  30    Right:  25  External Rotation:  Left:  95    Right:  95                      Strength:  : R rTC grossly 5-/5 with pain ER, Scap, Flex.                        Special Tests:      Right shoulder positive for the following special tests:Impingement  Palpation:  Palpation assessed shoulder: coracoid process.    Right shoulder tenderness present at: Acrimioclavicular                                     General     ROS     Patient is a 43 year old male with right side shoulder complaints.    Patient has the following significant findings with corresponding treatment plan.                Diagnosis 1:  R sh OA, pain, Impingement   Pain -  self management, education and home program  Decreased ROM/flexibility - manual therapy and therapeutic exercise  Decreased strength - therapeutic exercise and therapeutic activities  Impaired muscle performance - neuro re-education  Decreased function - therapeutic activities    Therapy Evaluation Codes:   1) History comprised of:   Personal factors that impact the plan of care:      None.    Comorbidity factors that impact  the plan of care are:      None.     Medications impacting care: None.  2) Examination of Body Systems comprised of:   Body structures and functions that impact the plan of care:      Shoulder.   Activity limitations that impact the plan of care are:      reaching.  3) Clinical presentation characteristics are:   Stable/Uncomplicated.  4) Decision-Making    Low complexity using standardized patient assessment instrument and/or measureable assessment of functional outcome.  Cumulative Therapy Evaluation is: Low complexity.    Previous and current functional limitations:  (See Goal Flow Sheet for this information)    Short term and Long term goals: (See Goal Flow Sheet for this information)     Communication ability:  Patient appears to be able to clearly communicate and understand verbal and written communication and follow directions correctly.  Treatment Explanation - The following has been discussed with the patient:   RX ordered/plan of care  Anticipated outcomes  Possible risks and side effects  This patient would benefit from PT intervention to resume normal activities.   Rehab potential is good.    Frequency:  1 X week, once daily  Duration:  for 6 weeks  Discharge Plan:  Achieve all LTG.  Independent in home treatment program.  Reach maximal therapeutic benefit.    Please refer to the daily flowsheet for treatment today, total treatment time and time spent performing 1:1 timed codes.

## 2018-07-24 NOTE — MR AVS SNAPSHOT
After Visit Summary   7/24/2018    Rocael Borden    MRN: [id number]           Patient Information     Date Of Birth          1975        Visit Information        Provider Department      7/24/2018 11:40 AM Gunnar Brooks PT [place] [facility]        Today's Diagnoses     Acute pain of right shoulder    -  1    Impingement syndrome, shoulder, right           Follow-ups after your visit        Your next 10 appointments already scheduled     Aug 10, 2018  3:40 PM CDT   GLORY Extremity with Gunnar Brooks PT   [facility] Physical Therapy (GLORY [place])    [address] [place] [address]  [address]  [place] [address] [phone]              Who to contact     If you have questions or need follow up information about today's clinic visit or your schedule please contact [facility] PHYSICAL THERAPY directly at [phone].  Normal or non-critical lab and imaging results will be communicated to you by [email or web address], letter or phone within 4 business days after the clinic has received the results. If you do not hear from us within 7 days, please contact the clinic through IntraStaget or phone. If you have a critical or abnormal lab result, we will notify you by phone as soon as possible.  Submit refill requests through [employer] or call your pharmacy and they will forward the refill request to us. Please allow 3 business days for your refill to be completed.          Additional Information About Your Visit        MyChart Information     [employer] gives you secure access to your electronic health record. If you see a primary care provider, you can also send messages to your care team and make appointments. If you have questions, please call your primary care clinic.  If you do not have a primary care provider, please call [phone] and they will assist you.        Care EveryWhere ID     This is your  Care EveryWhere ID. This could be used by other organizations to access your [facility] medical records  [id number]         Blood Pressure from Last 3 Encounters:   06/27/18 133/78   04/25/18 121/82   04/10/17 134/78    Weight from Last 3 Encounters:   06/27/18 115.2 kg (254 lb)   04/25/18 115.7 kg (255 lb)   04/10/17 116.1 kg (256 lb)              We Performed the Following     HC PT EVAL, LOW COMPLEXITY     THERAPEUTIC EXERCISES        Primary Care Provider Office Phone # Fax #    Aaron REVA Lynne [phone]       [address] [place] [address]        Equal Access to Services     ARAM KAUR : Hadii varinder Meyer, wamuna vilchisadaha, qaybta kaalmada adeteressayada, debbie ferguson . So [facility] [phone].    ATENCIÓN: Si habla español, tiene a gray disposición servicios gratuitos de asistencia lingüística. Llame al [phone].    We comply with applicable federal civil rights laws and [place] laws. We do not discriminate on the basis of race, color, national origin, age, disability, sex, sexual orientation, or gender identity.            Thank you!     Thank you for choosing [facility] PHYSICAL THERAP[facility]  for your care. Our goal is always to provide you with excellent care. Hearing back from our patients is one way we can continue to improve our services. Please take a few minutes to complete the written survey that you may receive in the mail after your visit with us. Thank you!             Your Updated Medication List - Protect others around you: Learn how to safely use, store and throw away your medicines at www.disposemymeds.org.          This list is accurate as of 7/24/18 12:25 PM.  Always use your most recent med list.                   Brand Name Dispense Instructions for use Diagnosis    azithromycin 250 MG tablet    ZITHROMAX    6 tablet    Two tablets first day, then one tablet daily for four days.    Other  acute nonsuppurative otitis media of left ear, recurrence not specified

## 2018-08-10 ENCOUNTER — THERAPY VISIT (OUTPATIENT)
Dept: PHYSICAL THERAPY | Facility: CLINIC | Age: 43
End: 2018-08-10
Payer: COMMERCIAL

## 2018-08-10 DIAGNOSIS — M25.511 ACUTE PAIN OF RIGHT SHOULDER: ICD-10-CM

## 2018-08-10 DIAGNOSIS — M75.41 IMPINGEMENT SYNDROME, SHOULDER, RIGHT: ICD-10-CM

## 2018-08-10 PROCEDURE — 97110 THERAPEUTIC EXERCISES: CPT | Mod: GP

## 2018-08-10 PROCEDURE — 97112 NEUROMUSCULAR REEDUCATION: CPT | Mod: GP

## 2019-08-15 ENCOUNTER — OFFICE VISIT (OUTPATIENT)
Dept: FAMILY MEDICINE | Facility: CLINIC | Age: 44
End: 2019-08-15
Payer: COMMERCIAL

## 2019-08-15 VITALS
TEMPERATURE: 98.8 F | SYSTOLIC BLOOD PRESSURE: 140 MMHG | HEART RATE: 107 BPM | WEIGHT: 263 LBS | DIASTOLIC BLOOD PRESSURE: 80 MMHG | BODY MASS INDEX: 34 KG/M2

## 2019-08-15 DIAGNOSIS — L91.8 INFLAMED SKIN TAG: Primary | ICD-10-CM

## 2019-08-15 PROCEDURE — 11200 RMVL SKIN TAGS UP TO&INC 15: CPT | Performed by: INTERNAL MEDICINE

## 2019-08-15 NOTE — PROGRESS NOTES
Subjective     Rocael Borden is a 44 year old male who presents to clinic today for the following health issues:    HPI   Concern - Pt is here to have skin tags removed from the inner thigh/groin area. Has had some removed before. They get caught on his shorts and get irritated.      Coaches freshman soccer at .          Objective    BP (!) 140/80 (BP Location: Left arm, Patient Position: Chair, Cuff Size: Adult Large)   Pulse 107   Temp 98.8  F (37.1  C) (Tympanic)   Wt 119.3 kg (263 lb)   BMI 34.00 kg/m    Body mass index is 34 kg/m .  Physical Exam   Gen: pleasant, well appearing man, no distress  Skin: two large skin tags on upper inner thigh/groin            Assessment & Plan     1. Inflamed skin tag  Two skin tags cleansed with alcohol, then frozen with curved hemostat frozen with liquid nitrogen freeze-thaw method x 3 each.   - REMOVAL OF SKIN TAGS, FIRST 15         Return in about 1 week (around 8/22/2019) for If no improvement.    Paige Whitten MD  [facility]

## 2019-10-04 ENCOUNTER — HEALTH MAINTENANCE LETTER (OUTPATIENT)
Age: 44
End: 2019-10-04

## 2020-06-18 ENCOUNTER — TELEPHONE (OUTPATIENT)
Dept: PEDIATRICS | Facility: CLINIC | Age: 45
End: 2020-06-18

## 2020-06-18 NOTE — TELEPHONE ENCOUNTER
The pt is aware and scheduled for his upcoming appointment.  Gloria Fowler on 6/18/2020 at 4:23 PM

## 2020-06-18 NOTE — TELEPHONE ENCOUNTER
I am currently only doing virtual care, so he will need to be set up with another provider. He has never been to our clinic before. Please help schedule.    BRONWYN Moctezuma, CNP

## 2020-06-18 NOTE — TELEPHONE ENCOUNTER
Reason for call:  Other   Patient called regarding (reason for call): Caller would like a physical and face to face for swelling legs; no avail appts in cent scheduling   Additional comments: wants this provider     Phone number to reach patient:  Home number on file [phone] (home)    Best Time:  any    Can we leave a detailed message on this number?  YES    Travel screening: Not Applicable

## 2020-08-19 NOTE — PATIENT INSTRUCTIONS
Restrict sodium to 2,000 mg/day  I recommend reading nutrition labels for a few weeks just to get an idea of what are good foods choices and what aren't  Wear compression stockings        Preventive Health Recommendations  Male Ages 40 to 49    Yearly exam:             See your health care provider every year in order to  o   Review health changes.   o   Discuss preventive care.    o   Review your medicines if your doctor has prescribed any.    You should be tested each year for STDs (sexually transmitted diseases) if you re at risk.     Have a cholesterol test every 5 years.     Have a colonoscopy (test for colon cancer) if someone in your family has had colon cancer or polyps before age 50.     After age 45, have a diabetes test (fasting glucose). If you are at risk for diabetes, you should have this test every 3 years.      Talk with your health care provider about whether or not a prostate cancer screening test (PSA) is right for you.    Shots: Get a flu shot each year. Get a tetanus shot every 10 years.     Nutrition:    Eat at least 5 servings of fruits and vegetables daily.     Eat whole-grain bread, whole-wheat pasta and brown rice instead of white grains and rice.     Get adequate Calcium and Vitamin D.     Lifestyle    Exercise for at least 150 minutes a week (30 minutes a day, 5 days a week). This will help you control your weight and prevent disease.     Limit alcohol to one drink per day.     No smoking.     Wear sunscreen to prevent skin cancer.     See your dentist every six months for an exam and cleaning.

## 2020-08-19 NOTE — PROGRESS NOTES
SUBJECTIVE:   CC: Rocael Borden is an 45 year old male who presents for preventative health visit.     HPI     He saw dermatology for hyperpigmentation of bilateral lower legs  Was told its related to his veins  Has was experiencing dependent edema at that time as well  Edema improves with elevated  He has since reduced his sodium intake and edema is improving  Rare exercise  Non smoker   He bought compressions stockings and wears occasionally. Less now d/t hot weather. Edema resolves when wearing compression stockings    He works in real estate management  Used to travel weekly  Now working from home d/t Covid which he enjoys      Today's PHQ-2 Score:   PHQ-2 ( 1999 Pfizer) 6/27/2018   Q1: Little interest or pleasure in doing things 0   Q2: Feeling down, depressed or hopeless 0   PHQ-2 Score 0   Q1: Little interest or pleasure in doing things -   Q2: Feeling down, depressed or hopeless -   PHQ-2 Score -       Abuse: Current or Past(Physical, Sexual or Emotional)- No  Do you feel safe in your environment? Yes        Social History     Tobacco Use     Smoking status: Never Smoker     Smokeless tobacco: Current User     Types: Chew   Substance Use Topics     Alcohol use: Yes     Alcohol/week: 0.0 standard drinks     Comment: 3 drinks twice per week         Alcohol Use 4/10/2017   Prescreen: >3 drinks/day or >7 drinks/week? The patient does not drink >3 drinks per day nor >7 drinks per week.       Last PSA:   PSA   Date Value Ref Range Status   04/11/2017 1.42 0 - 4 ug/L Final     Comment:     Assay Method:  Chemiluminescence using Siemens Vista analyzer       Reviewed orders with patient. Reviewed health maintenance and updated orders accordingly - Yes  Lab work is in process  Labs reviewed in EPIC  BP Readings from Last 3 Encounters:   08/20/20 110/78   08/15/19 (!) 140/80   06/27/18 133/78    Wt Readings from Last 3 Encounters:   08/20/20 115.5 kg (254 lb 11.2 oz)   08/15/19 119.3 kg (263 lb)   06/27/18 115.2 kg (254  lb)                  Patient Active Problem List   Diagnosis     Skin Tags     Hyperlipidemia LDL goal <160     Obesity (BMI 30-39.9)     Lentigo     Knee pain     Erectile dysfunction, unspecified erectile dysfunction type     Impingement syndrome, shoulder, right     Acute pain of right shoulder     Past Surgical History:   Procedure Laterality Date     NO HISTORY OF SURGERY         Social History     Tobacco Use     Smoking status: Never Smoker     Smokeless tobacco: Current User     Types: Chew   Substance Use Topics     Alcohol use: Yes     Alcohol/week: 0.0 standard drinks     Frequency: 4 or more times a week     Drinks per session: 1 or 2     Family History   Problem Relation Age of Onset     Diabetes Maternal Grandmother      C.A.D. No family hx of      Prostate Cancer No family hx of      Cancer - colorectal No family hx of          No current outpatient medications on file.       Reviewed and updated as needed this visit by clinical staff         Reviewed and updated as needed this visit by Provider        Past Medical History:   Diagnosis Date     Erectile dysfunction, unspecified erectile dysfunction type 1/11/2016     NO ACTIVE PROBLEMS         Review of Systems  CONSTITUTIONAL: NEGATIVE for fever, chills, change in weight  INTEGUMENTARY/SKIN: NEGATIVE for worrisome rashes, moles or lesions  EYES: NEGATIVE for vision changes or irritation  ENT: NEGATIVE for ear, mouth and throat problems  RESP: NEGATIVE for significant cough or SOB  CV: NEGATIVE for chest pain, palpitations or peripheral edema  GI: NEGATIVE for nausea, abdominal pain, heartburn, or change in bowel habits   male: negative for dysuria, hematuria, decreased urinary stream, erectile dysfunction, urethral discharge  MUSCULOSKELETAL: NEGATIVE for significant arthralgias or myalgia  NEURO: NEGATIVE for weakness, dizziness or paresthesias  PSYCHIATRIC: NEGATIVE for changes in mood or affect    OBJECTIVE:   /78 (BP Location: Right arm,  "Patient Position: Chair, Cuff Size: Adult Large)   Pulse 77   Temp 98  F (36.7  C) (Oral)   Resp 16   Ht 1.867 m (6' 1.5\")   Wt 115.5 kg (254 lb 11.2 oz)   SpO2 96%   BMI 33.15 kg/m      Physical Exam  GENERAL: healthy, alert and no distress  EYES: Eyes grossly normal to inspection, PERRL and conjunctivae and sclerae normal  HENT: ear canals and TM's normal, nose and mouth without ulcers or lesions  NECK: no adenopathy, no asymmetry, masses, or scars and thyroid normal to palpation  RESP: lungs clear to auscultation - no rales, rhonchi or wheezes  CV: regular rate and rhythm, normal S1 S2, no S3 or S4, no murmur, click or rub, no peripheral edema and peripheral pulses strong  ABDOMEN: soft, nontender, no hepatosplenomegaly, no masses and bowel sounds normal  MS: no gross musculoskeletal defects noted, no edema  SKIN: hyperpigmentation bilateral anterior lower legs  NEURO: Normal strength and tone, mentation intact and speech normal  PSYCH: mentation appears normal, affect normal/bright    Diagnostic Test Results:  Labs reviewed in Epic    ASSESSMENT/PLAN:       ICD-10-CM    1. Routine general medical examination at a health care facility  Z00.00 Lipid Profile (Chol, Trig, HDL, LDL calc)     COVID-19 Virus (Coronavirus) Antibody & Titer Reflex     Hemoglobin A1c     Comprehensive metabolic panel (BMP + Alb, Alk Phos, ALT, AST, Total. Bili, TP)     CANCELED: Basic metabolic panel  (Ca, Cl, CO2, Creat, Gluc, K, Na, BUN)     CANCELED: Hepatic panel (Albumin, ALT, AST, Bili, Alk Phos, TP)   2. Edema, unspecified type  R60.9      We discussed treatment options for edema including sodium reduction, elevation and compression. No edema on exam today and it sounds like it has already improved some with sodium reduction. Recommend daily use of compression stockings as weather allows. If worsening, refer to vascular as he likely has some venous insufficiency. Hold off on diuretic to BPs on lower end    COUNSELING: " "  Reviewed preventive health counseling, as reflected in patient instructions       Regular exercise       Healthy diet/nutrition    Estimated body mass index is 34 kg/m  as calculated from the following:    Height as of 6/27/18: 1.873 m (6' 1.75\").    Weight as of 8/15/19: 119.3 kg (263 lb).          reports that he has never smoked. His smokeless tobacco use includes chew.      Counseling Resources:  ATP IV Guidelines  Pooled Cohorts Equation Calculator  FRAX Risk Assessment  ICSI Preventive Guidelines  Dietary Guidelines for Americans, 2010  USDA's MyPlate  ASA Prophylaxis  Lung CA Screening    BRONWYN Hernandes [facility] [place]  "

## 2020-08-20 ENCOUNTER — OFFICE VISIT (OUTPATIENT)
Dept: PEDIATRICS | Facility: CLINIC | Age: 45
End: 2020-08-20
Payer: COMMERCIAL

## 2020-08-20 VITALS
TEMPERATURE: 98 F | HEIGHT: 74 IN | SYSTOLIC BLOOD PRESSURE: 110 MMHG | RESPIRATION RATE: 16 BRPM | HEART RATE: 77 BPM | OXYGEN SATURATION: 96 % | WEIGHT: 254.7 LBS | BODY MASS INDEX: 32.69 KG/M2 | DIASTOLIC BLOOD PRESSURE: 78 MMHG

## 2020-08-20 DIAGNOSIS — R74.01 ELEVATED TRANSAMINASE LEVEL: ICD-10-CM

## 2020-08-20 DIAGNOSIS — R60.9 EDEMA, UNSPECIFIED TYPE: ICD-10-CM

## 2020-08-20 DIAGNOSIS — Z00.00 ROUTINE GENERAL MEDICAL EXAMINATION AT A HEALTH CARE FACILITY: Primary | ICD-10-CM

## 2020-08-20 LAB — HBA1C MFR BLD: 5.4 % (ref 0–5.6)

## 2020-08-20 PROCEDURE — 80061 LIPID PANEL: CPT | Performed by: NURSE PRACTITIONER

## 2020-08-20 PROCEDURE — 36415 COLL VENOUS BLD VENIPUNCTURE: CPT | Performed by: NURSE PRACTITIONER

## 2020-08-20 PROCEDURE — 80053 COMPREHEN METABOLIC PANEL: CPT | Performed by: NURSE PRACTITIONER

## 2020-08-20 PROCEDURE — 99396 PREV VISIT EST AGE 40-64: CPT | Mod: 25 | Performed by: NURSE PRACTITIONER

## 2020-08-20 PROCEDURE — 90715 TDAP VACCINE 7 YRS/> IM: CPT | Performed by: NURSE PRACTITIONER

## 2020-08-20 PROCEDURE — 83036 HEMOGLOBIN GLYCOSYLATED A1C: CPT | Performed by: NURSE PRACTITIONER

## 2020-08-20 PROCEDURE — 90471 IMMUNIZATION ADMIN: CPT | Performed by: NURSE PRACTITIONER

## 2020-08-20 SDOH — HEALTH STABILITY: MENTAL HEALTH: HOW OFTEN DO YOU HAVE A DRINK CONTAINING ALCOHOL?: 4 OR MORE TIMES A WEEK

## 2020-08-20 SDOH — HEALTH STABILITY: MENTAL HEALTH: HOW MANY STANDARD DRINKS CONTAINING ALCOHOL DO YOU HAVE ON A TYPICAL DAY?: 1 OR 2

## 2020-08-20 ASSESSMENT — MIFFLIN-ST. JEOR: SCORE: 2097.12

## 2020-08-21 LAB
ALBUMIN SERPL-MCNC: 4.2 G/DL (ref 3.4–5)
ALP SERPL-CCNC: 95 U/L (ref 40–150)
ALT SERPL W P-5'-P-CCNC: 316 U/L (ref 0–70)
ANION GAP SERPL CALCULATED.3IONS-SCNC: 8 MMOL/L (ref 3–14)
AST SERPL W P-5'-P-CCNC: 165 U/L (ref 0–45)
BILIRUB SERPL-MCNC: 1 MG/DL (ref 0.2–1.3)
BUN SERPL-MCNC: 8 MG/DL (ref 7–30)
CALCIUM SERPL-MCNC: 9.4 MG/DL (ref 8.5–10.1)
CHLORIDE SERPL-SCNC: 108 MMOL/L (ref 94–109)
CHOLEST SERPL-MCNC: 242 MG/DL
CO2 SERPL-SCNC: 23 MMOL/L (ref 20–32)
CREAT SERPL-MCNC: 0.72 MG/DL (ref 0.66–1.25)
GFR SERPL CREATININE-BSD FRML MDRD: >90 ML/MIN/{1.73_M2}
GLUCOSE SERPL-MCNC: 80 MG/DL (ref 70–99)
HDLC SERPL-MCNC: 60 MG/DL
LDLC SERPL CALC-MCNC: 129 MG/DL
NONHDLC SERPL-MCNC: 182 MG/DL
POTASSIUM SERPL-SCNC: 4.2 MMOL/L (ref 3.4–5.3)
PROT SERPL-MCNC: 8 G/DL (ref 6.8–8.8)
SODIUM SERPL-SCNC: 139 MMOL/L (ref 133–144)
TRIGL SERPL-MCNC: 266 MG/DL

## 2020-08-21 NOTE — RESULT ENCOUNTER NOTE
Your liver enzymes have more than doubled since last checked. I've asked the lab to add on screening tests for hepatitis to your labs from yesterday. If they can't, we will have you come in for a lab only appointment.  I also recommend getting an ultra sound of the liver which can be done at [facility]. You should receive a call to schedule this, otherwise you can call [phone] to schedule.  Avoid all alcohol and tylenol and please schedule a lab only appointment in 1 month to recheck.   Your cholesterol is elevated, but not high enough that you need to be treated with a medication. Reduce your consumption of cholesterol and saturated fats and eliminate trans fats from your diet. Increase your consumption of healthy fats (nuts, fish, seafood, avocado, olive oil), whole grains and fruits and vegetables. I also recommend exercising 150 minutes a week. These changes will help to improve your cholesterol.   A1c (average blood sugar over the past 3 months) is normal.  Nakia Santoro, CNP

## 2020-08-31 DIAGNOSIS — R74.01 ELEVATED TRANSAMINASE LEVEL: ICD-10-CM

## 2020-08-31 PROCEDURE — 86803 HEPATITIS C AB TEST: CPT | Performed by: NURSE PRACTITIONER

## 2020-08-31 PROCEDURE — 86706 HEP B SURFACE ANTIBODY: CPT | Performed by: NURSE PRACTITIONER

## 2020-08-31 PROCEDURE — 36415 COLL VENOUS BLD VENIPUNCTURE: CPT | Performed by: NURSE PRACTITIONER

## 2020-08-31 PROCEDURE — 86704 HEP B CORE ANTIBODY TOTAL: CPT | Performed by: NURSE PRACTITIONER

## 2020-08-31 PROCEDURE — 80076 HEPATIC FUNCTION PANEL: CPT | Performed by: NURSE PRACTITIONER

## 2020-08-31 PROCEDURE — 87340 HEPATITIS B SURFACE AG IA: CPT | Performed by: NURSE PRACTITIONER

## 2020-09-01 LAB
ALBUMIN SERPL-MCNC: 4.2 G/DL (ref 3.4–5)
ALP SERPL-CCNC: 94 U/L (ref 40–150)
ALT SERPL W P-5'-P-CCNC: 246 U/L (ref 0–70)
AST SERPL W P-5'-P-CCNC: 121 U/L (ref 0–45)
BILIRUB DIRECT SERPL-MCNC: 0.2 MG/DL (ref 0–0.2)
BILIRUB SERPL-MCNC: 1.2 MG/DL (ref 0.2–1.3)
HBV CORE AB SERPL QL IA: NONREACTIVE
HBV SURFACE AB SERPL IA-ACNC: 0 M[IU]/ML
HBV SURFACE AG SERPL QL IA: NONREACTIVE
HCV AB SERPL QL IA: NONREACTIVE
PROT SERPL-MCNC: 7.8 G/DL (ref 6.8–8.8)

## 2020-09-01 NOTE — RESULT ENCOUNTER NOTE
Thank you for scheduling a lab appointment. Your liver enzymes continue to be elevated though are somewhat improved. I will watch for the results of the ultra sound and the hepatitis testing and let you know. I will likely refer you to gastroenterology.  Nakia Santoro, CNP

## 2020-09-02 NOTE — RESULT ENCOUNTER NOTE
Rocael,  Testing for hepatitis B and C are negative.  In the future, we should vaccinate you for hepatitis B to further protect your liver.   I see you're scheduled for your ultra sound tomorrow. I will watch for those results.  Nakia Santoro, CNP

## 2020-09-03 ENCOUNTER — HOSPITAL ENCOUNTER (OUTPATIENT)
Dept: ULTRASOUND IMAGING | Facility: CLINIC | Age: 45
Discharge: HOME | End: 2020-09-03
Attending: NURSE PRACTITIONER | Admitting: NURSE PRACTITIONER
Payer: COMMERCIAL

## 2020-09-03 DIAGNOSIS — R74.01 ELEVATED TRANSAMINASE LEVEL: ICD-10-CM

## 2020-09-03 DIAGNOSIS — K76.0 FATTY LIVER: Primary | ICD-10-CM

## 2020-09-03 PROCEDURE — 76705 ECHO EXAM OF ABDOMEN: CPT

## 2020-09-03 NOTE — RESULT ENCOUNTER NOTE
Rocael,  Your ultra sound showed fatty liver, which is often from poor diet and obesity. Because of the degree of elevation of your liver enzymes, I recommend seeing a gastroenterologist. I put in a referral for [facility] in [place]. You should receive a call to schedule with them. Otherwise, you can call [phone] to schedule. In the meantime, avoid all alcohol and tylenol.  Nakia Santoro, CNP

## 2020-11-08 ENCOUNTER — HEALTH MAINTENANCE LETTER (OUTPATIENT)
Age: 45
End: 2020-11-08

## 2021-07-07 ENCOUNTER — TRANSFERRED RECORDS (OUTPATIENT)
Dept: HEALTH INFORMATION MANAGEMENT | Facility: CLINIC | Age: 46
End: 2021-07-07

## 2021-09-11 ENCOUNTER — HEALTH MAINTENANCE LETTER (OUTPATIENT)
Age: 46
End: 2021-09-11

## 2021-09-16 ENCOUNTER — OFFICE VISIT (OUTPATIENT)
Dept: PEDIATRICS | Facility: CLINIC | Age: 46
End: 2021-09-16
Payer: COMMERCIAL

## 2021-09-16 VITALS
RESPIRATION RATE: 16 BRPM | WEIGHT: 253.8 LBS | SYSTOLIC BLOOD PRESSURE: 120 MMHG | OXYGEN SATURATION: 97 % | HEART RATE: 81 BPM | BODY MASS INDEX: 32.57 KG/M2 | TEMPERATURE: 97.2 F | HEIGHT: 74 IN | DIASTOLIC BLOOD PRESSURE: 82 MMHG

## 2021-09-16 DIAGNOSIS — Z87.442 HISTORY OF KIDNEY STONES: ICD-10-CM

## 2021-09-16 DIAGNOSIS — Z13.220 SCREENING FOR HYPERLIPIDEMIA: ICD-10-CM

## 2021-09-16 DIAGNOSIS — K76.0 FATTY LIVER: ICD-10-CM

## 2021-09-16 DIAGNOSIS — Z00.00 ROUTINE GENERAL MEDICAL EXAMINATION AT A HEALTH CARE FACILITY: Primary | ICD-10-CM

## 2021-09-16 PROCEDURE — 90636 HEP A/HEP B VACC ADULT IM: CPT | Performed by: NURSE PRACTITIONER

## 2021-09-16 PROCEDURE — 36415 COLL VENOUS BLD VENIPUNCTURE: CPT | Performed by: NURSE PRACTITIONER

## 2021-09-16 PROCEDURE — 80061 LIPID PANEL: CPT | Performed by: NURSE PRACTITIONER

## 2021-09-16 PROCEDURE — 80053 COMPREHEN METABOLIC PANEL: CPT | Performed by: NURSE PRACTITIONER

## 2021-09-16 PROCEDURE — 90471 IMMUNIZATION ADMIN: CPT | Performed by: NURSE PRACTITIONER

## 2021-09-16 PROCEDURE — 99396 PREV VISIT EST AGE 40-64: CPT | Mod: 25 | Performed by: NURSE PRACTITIONER

## 2021-09-16 RX ORDER — TAMSULOSIN HYDROCHLORIDE 0.4 MG/1
0.4 CAPSULE ORAL DAILY
Qty: 20 CAPSULE | Refills: 1 | Status: SHIPPED | OUTPATIENT
Start: 2021-09-16 | End: 2024-07-11

## 2021-09-16 ASSESSMENT — ENCOUNTER SYMPTOMS
NAUSEA: 0
CHILLS: 0
CONSTIPATION: 0
PARESTHESIAS: 0
EYE PAIN: 0
MYALGIAS: 0
DIZZINESS: 0
HEADACHES: 0
SHORTNESS OF BREATH: 0
DYSURIA: 0
HEARTBURN: 0
JOINT SWELLING: 0
ARTHRALGIAS: 0
DIARRHEA: 0
FEVER: 0
FREQUENCY: 0
PALPITATIONS: 0
SORE THROAT: 0
HEMATOCHEZIA: 0
NERVOUS/ANXIOUS: 0
WEAKNESS: 0
HEMATURIA: 0
COUGH: 0
ABDOMINAL PAIN: 0

## 2021-09-16 ASSESSMENT — MIFFLIN-ST. JEOR: SCORE: 2088.04

## 2021-09-16 NOTE — PROGRESS NOTES
SUBJECTIVE:   CC: Rocael Borden is an 46 year old male who presents for preventative health visit.       Patient has been advised of split billing requirements and indicates understanding: Yes  Healthy Habits:     Getting at least 3 servings of Calcium per day:  Yes    Bi-annual eye exam:  NO    Dental care twice a year:  Yes    Sleep apnea or symptoms of sleep apnea:  None    Diet:  Regular (no restrictions)    Frequency of exercise:  1 day/week    Duration of exercise:  30-45 minutes    Taking medications regularly:  No    Barriers to taking medications:  Not applicable    Medication side effects:  Not applicable    PHQ-2 Total Score: 0    Additional concerns today:  No      Transaminases more than doubled last year  Hep screening negative  US showed fatty liver   Had consult with [facility]  Offered further imaging (Fibro-Scan) but patient declined  Advised to work on weight loss    Mental health is great!    Today's PHQ-2 Score:   PHQ-2 ( 1999 Pfizer) 9/16/2021   Q1: Little interest or pleasure in doing things 0   Q2: Feeling down, depressed or hopeless 0   PHQ-2 Score 0   Q1: Little interest or pleasure in doing things Not at all   Q2: Feeling down, depressed or hopeless Not at all   PHQ-2 Score 0       Abuse: Current or Past(Physical, Sexual or Emotional)- No  Do you feel safe in your environment? Yes    Have you ever done Advance Care Planning? (For example, a Health Directive, POLST, or a discussion with a medical provider or your loved ones about your wishes): No, advance care planning information given to patient to review.  Patient plans to discuss their wishes with loved ones or provider.      Social History     Tobacco Use     Smoking status: Never Smoker     Smokeless tobacco: Current User     Types: Chew   Substance Use Topics     Alcohol use: Yes     Alcohol/week: 0.0 standard drinks         Alcohol Use 9/16/2021   Prescreen: >3 drinks/day or >7 drinks/week? Yes   Prescreen: >3 drinks/day or >7  drinks/week? -   AUDIT SCORE  5       Last PSA:   PSA   Date Value Ref Range Status   04/11/2017 1.42 0 - 4 ug/L Final     Comment:     Assay Method:  Chemiluminescence using Siemens Vista analyzer       Reviewed orders with patient. Reviewed health maintenance and updated orders accordingly - Yes  Lab work is in process  Labs reviewed in EPIC  BP Readings from Last 3 Encounters:   09/16/21 120/82   08/20/20 110/78   08/15/19 (!) 140/80    Wt Readings from Last 3 Encounters:   09/16/21 115.1 kg (253 lb 12.8 oz)   08/20/20 115.5 kg (254 lb 11.2 oz)   08/15/19 119.3 kg (263 lb)                  Patient Active Problem List   Diagnosis     Skin Tags     Hyperlipidemia LDL goal <160     Obesity (BMI 30-39.9)     Lentigo     Knee pain     Erectile dysfunction, unspecified erectile dysfunction type     Impingement syndrome, shoulder, right     Acute pain of right shoulder     Past Surgical History:   Procedure Laterality Date     NO HISTORY OF SURGERY         Social History     Tobacco Use     Smoking status: Current Some Day Smoker     Types: Cigarettes     Smokeless tobacco: Former User     Types: Chew   Substance Use Topics     Alcohol use: Yes     Alcohol/week: 0.0 standard drinks     Family History   Problem Relation Age of Onset     Diabetes Maternal Grandmother      C.A.D. No family hx of      Prostate Cancer No family hx of      Cancer - colorectal No family hx of          Current Outpatient Medications   Medication Sig Dispense Refill     tamsulosin (FLOMAX) 0.4 MG capsule Take 1 capsule (0.4 mg) by mouth daily At onset of flank pain for kidney stone 20 capsule 1       Reviewed and updated as needed this visit by clinical staff                 Reviewed and updated as needed this visit by Provider                Past Medical History:   Diagnosis Date     Erectile dysfunction, unspecified erectile dysfunction type 1/11/2016     NO ACTIVE PROBLEMS         Review of Systems   Constitutional: Negative for chills and  "fever.   HENT: Positive for hearing loss. Negative for congestion, ear pain and sore throat.    Eyes: Negative for pain and visual disturbance.   Respiratory: Negative for cough and shortness of breath.    Cardiovascular: Negative for chest pain, palpitations and peripheral edema.   Gastrointestinal: Negative for abdominal pain, constipation, diarrhea, heartburn, hematochezia and nausea.   Genitourinary: Negative for discharge, dysuria, frequency, genital sores, hematuria, impotence and urgency.   Musculoskeletal: Negative for arthralgias, joint swelling and myalgias.   Skin: Negative for rash.   Neurological: Negative for dizziness, weakness, headaches and paresthesias.   Psychiatric/Behavioral: Negative for mood changes. The patient is not nervous/anxious.        OBJECTIVE:   /82 (BP Location: Right arm, Patient Position: Chair, Cuff Size: Adult Large)   Pulse 81   Temp 97.2  F (36.2  C) (Tympanic)   Resp 16   Ht 1.867 m (6' 1.5\")   Wt 115.1 kg (253 lb 12.8 oz)   SpO2 97%   BMI 33.03 kg/m      Physical Exam  GENERAL: healthy, alert and no distress  EYES: Eyes grossly normal to inspection, PERRL and conjunctivae and sclerae normal  HENT: ear canals and TM's normal, nose and mouth without ulcers or lesions  NECK: no adenopathy, no asymmetry, masses, or scars and thyroid normal to palpation  RESP: lungs clear to auscultation - no rales, rhonchi or wheezes  CV: regular rate and rhythm, normal S1 S2, no S3 or S4, no murmur, click or rub, no peripheral edema and peripheral pulses strong  ABDOMEN: soft, nontender, no hepatosplenomegaly, no masses and bowel sounds normal  MS: no gross musculoskeletal defects noted, no edema  SKIN: no suspicious lesions or rashes  NEURO: Normal strength and tone, mentation intact and speech normal  PSYCH: mentation appears normal, affect normal/bright    Diagnostic Test Results:  Labs reviewed in Epic    ASSESSMENT/PLAN:       ICD-10-CM    1. Routine general medical " "examination at a health care facility  Z00.00 Lipid panel reflex to direct LDL Fasting     Comprehensive metabolic panel (BMP + Alb, Alk Phos, ALT, AST, Total. Bili, TP)     Lipid panel reflex to direct LDL Fasting     Comprehensive metabolic panel (BMP + Alb, Alk Phos, ALT, AST, Total. Bili, TP)   2. Screening for hyperlipidemia  Z13.220    3. History of kidney stones  Z87.442 tamsulosin (FLOMAX) 0.4 MG capsule   4. Fatty liver  K76.0      History of kidney stones. Calcium oxalate  [place] pain as when he had stones in the past. Was headed towards ED and ended up passing it  Asks about what he can do to prevent  Discussed hydration and foods to avoid  Flomax at hand at onset of symptoms  Advised to be used for short term only for symptomatic relief and to help with passing  If symptoms worsening, may require imaging and this was discussed    Patient has been advised of split billing requirements and indicates understanding: Yes  COUNSELING:   Reviewed preventive health counseling, as reflected in patient instructions       Regular exercise       Healthy diet/nutrition       Immunizations    Vaccinated for: Hepatitis A and Hepatitis B          Estimated body mass index is 33.15 kg/m  as calculated from the following:    Height as of 8/20/20: 1.867 m (6' 1.5\").    Weight as of 8/20/20: 115.5 kg (254 lb 11.2 oz).     Weight management plan: Discussed healthy diet and exercise guidelines    He reports that he has never smoked. His smokeless tobacco use includes chew.      Counseling Resources:  ATP IV Guidelines  Pooled Cohorts Equation Calculator  FRAX Risk Assessment  ICSI Preventive Guidelines  Dietary Guidelines for Americans, 2010  USDA's MyPlate  ASA Prophylaxis  Lung CA Screening    BRONWYN Hernandes CNP  [facility]    "

## 2021-09-16 NOTE — PATIENT INSTRUCTIONS
For kidney stone - make sure you are staying well hydrated  At onset of pain similar to previous stones, can take 1 tablet Flomax daily until stone passed  If pain not improving, then you should be seen    Plan to get flu shot around mid October    Preventive Health Recommendations  Male Ages 40 to 49    Yearly exam:             See your health care provider every year in order to  o   Review health changes.   o   Discuss preventive care.    o   Review your medicines if your doctor has prescribed any.    You should be tested each year for STDs (sexually transmitted diseases) if you re at risk.     Have a cholesterol test every 5 years.     Have a colonoscopy (test for colon cancer) if someone in your family has had colon cancer or polyps before age 50.     After age 45, have a diabetes test (fasting glucose). If you are at risk for diabetes, you should have this test every 3 years.      Talk with your health care provider about whether or not a prostate cancer screening test (PSA) is right for you.    Shots: Get a flu shot each year. Get a tetanus shot every 10 years.     Nutrition:    Eat at least 5 servings of fruits and vegetables daily.     Eat whole-grain bread, whole-wheat pasta and brown rice instead of white grains and rice.     Get adequate Calcium and Vitamin D.     Lifestyle    Exercise for at least 150 minutes a week (30 minutes a day, 5 days a week). This will help you control your weight and prevent disease.     Limit alcohol to one drink per day.     No smoking.     Wear sunscreen to prevent skin cancer.     See your dentist every six months for an exam and cleaning.

## 2021-09-18 LAB
ALBUMIN SERPL-MCNC: 4.3 G/DL (ref 3.4–5)
ALP SERPL-CCNC: 87 U/L (ref 40–150)
ALT SERPL W P-5'-P-CCNC: 426 U/L (ref 0–70)
ANION GAP SERPL CALCULATED.3IONS-SCNC: 10 MMOL/L (ref 3–14)
AST SERPL W P-5'-P-CCNC: 282 U/L (ref 0–45)
BILIRUB SERPL-MCNC: 1.2 MG/DL (ref 0.2–1.3)
BUN SERPL-MCNC: 11 MG/DL (ref 7–30)
CALCIUM SERPL-MCNC: 9.9 MG/DL (ref 8.5–10.1)
CHLORIDE BLD-SCNC: 107 MMOL/L (ref 94–109)
CHOLEST SERPL-MCNC: 264 MG/DL
CO2 SERPL-SCNC: 23 MMOL/L (ref 20–32)
CREAT SERPL-MCNC: 0.79 MG/DL (ref 0.66–1.25)
FASTING STATUS PATIENT QL REPORTED: YES
GFR SERPL CREATININE-BSD FRML MDRD: >90 ML/MIN/1.73M2
GLUCOSE BLD-MCNC: 82 MG/DL (ref 70–99)
HDLC SERPL-MCNC: 59 MG/DL
LDLC SERPL CALC-MCNC: 162 MG/DL
NONHDLC SERPL-MCNC: 205 MG/DL
POTASSIUM BLD-SCNC: 4.2 MMOL/L (ref 3.4–5.3)
PROT SERPL-MCNC: 7.8 G/DL (ref 6.8–8.8)
SODIUM SERPL-SCNC: 140 MMOL/L (ref 133–144)
TRIGL SERPL-MCNC: 214 MG/DL

## 2021-11-29 ENCOUNTER — ALLIED HEALTH/NURSE VISIT (OUTPATIENT)
Dept: PEDIATRICS | Facility: CLINIC | Age: 46
End: 2021-11-29
Payer: COMMERCIAL

## 2021-11-29 DIAGNOSIS — Z23 NEED FOR HEPATITIS A AND B VACCINATION: Primary | ICD-10-CM

## 2021-11-29 PROCEDURE — 99207 PR NO CHARGE NURSE ONLY: CPT

## 2021-11-29 PROCEDURE — 90471 IMMUNIZATION ADMIN: CPT

## 2021-11-29 PROCEDURE — 90636 HEP A/HEP B VACC ADULT IM: CPT

## 2022-03-28 ENCOUNTER — ALLIED HEALTH/NURSE VISIT (OUTPATIENT)
Dept: PEDIATRICS | Facility: CLINIC | Age: 47
End: 2022-03-28
Payer: COMMERCIAL

## 2022-03-28 DIAGNOSIS — Z23 NEED FOR HEPATITIS A AND B VACCINATION: Primary | ICD-10-CM

## 2022-03-28 PROCEDURE — 90471 IMMUNIZATION ADMIN: CPT

## 2022-03-28 PROCEDURE — 99207 PR NO CHARGE NURSE ONLY: CPT

## 2022-03-28 PROCEDURE — 90636 HEP A/HEP B VACC ADULT IM: CPT

## 2022-05-26 ENCOUNTER — OFFICE VISIT (OUTPATIENT)
Dept: PEDIATRICS | Facility: CLINIC | Age: 47
End: 2022-05-26
Payer: COMMERCIAL

## 2022-05-26 VITALS
DIASTOLIC BLOOD PRESSURE: 88 MMHG | OXYGEN SATURATION: 96 % | RESPIRATION RATE: 16 BRPM | HEART RATE: 76 BPM | WEIGHT: 246 LBS | TEMPERATURE: 97.9 F | BODY MASS INDEX: 32.02 KG/M2 | SYSTOLIC BLOOD PRESSURE: 128 MMHG

## 2022-05-26 DIAGNOSIS — S46.219A BICEPS TENDON TEAR: ICD-10-CM

## 2022-05-26 DIAGNOSIS — Z12.11 COLON CANCER SCREENING: ICD-10-CM

## 2022-05-26 DIAGNOSIS — Z01.818 PRE-OP EXAM: Primary | ICD-10-CM

## 2022-05-26 PROCEDURE — 99214 OFFICE O/P EST MOD 30 MIN: CPT | Performed by: INTERNAL MEDICINE

## 2022-05-26 SDOH — ECONOMIC STABILITY: FOOD INSECURITY: WITHIN THE PAST 12 MONTHS, YOU WORRIED THAT YOUR FOOD WOULD RUN OUT BEFORE YOU GOT MONEY TO BUY MORE.: NEVER TRUE

## 2022-05-26 SDOH — ECONOMIC STABILITY: INCOME INSECURITY: HOW HARD IS IT FOR YOU TO PAY FOR THE VERY BASICS LIKE FOOD, HOUSING, MEDICAL CARE, AND HEATING?: NOT HARD AT ALL

## 2022-05-26 SDOH — ECONOMIC STABILITY: INCOME INSECURITY: IN THE LAST 12 MONTHS, WAS THERE A TIME WHEN YOU WERE NOT ABLE TO PAY THE MORTGAGE OR RENT ON TIME?: NO

## 2022-05-26 SDOH — HEALTH STABILITY: PHYSICAL HEALTH: ON AVERAGE, HOW MANY DAYS PER WEEK DO YOU ENGAGE IN MODERATE TO STRENUOUS EXERCISE (LIKE A BRISK WALK)?: 0 DAYS

## 2022-05-26 SDOH — ECONOMIC STABILITY: FOOD INSECURITY: WITHIN THE PAST 12 MONTHS, THE FOOD YOU BOUGHT JUST DIDN'T LAST AND YOU DIDN'T HAVE MONEY TO GET MORE.: NEVER TRUE

## 2022-05-26 SDOH — ECONOMIC STABILITY: TRANSPORTATION INSECURITY
IN THE PAST 12 MONTHS, HAS THE LACK OF TRANSPORTATION KEPT YOU FROM MEDICAL APPOINTMENTS OR FROM GETTING MEDICATIONS?: NO

## 2022-05-26 SDOH — HEALTH STABILITY: PHYSICAL HEALTH: ON AVERAGE, HOW MANY MINUTES DO YOU ENGAGE IN EXERCISE AT THIS LEVEL?: 0 MIN

## 2022-05-26 SDOH — ECONOMIC STABILITY: TRANSPORTATION INSECURITY
IN THE PAST 12 MONTHS, HAS LACK OF TRANSPORTATION KEPT YOU FROM MEETINGS, WORK, OR FROM GETTING THINGS NEEDED FOR DAILY LIVING?: NO

## 2022-05-26 ASSESSMENT — LIFESTYLE VARIABLES
HOW OFTEN DO YOU HAVE SIX OR MORE DRINKS ON ONE OCCASION: LESS THAN MONTHLY
HOW OFTEN DO YOU HAVE A DRINK CONTAINING ALCOHOL: 2-3 TIMES A WEEK
SKIP TO QUESTIONS 9-10: 0
AUDIT-C TOTAL SCORE: 5
HOW MANY STANDARD DRINKS CONTAINING ALCOHOL DO YOU HAVE ON A TYPICAL DAY: 3 OR 4

## 2022-05-26 ASSESSMENT — SOCIAL DETERMINANTS OF HEALTH (SDOH)
HOW OFTEN DO YOU ATTEND CHURCH OR RELIGIOUS SERVICES?: NEVER
DO YOU BELONG TO ANY CLUBS OR ORGANIZATIONS SUCH AS CHURCH GROUPS UNIONS, FRATERNAL OR ATHLETIC GROUPS, OR SCHOOL GROUPS?: NO
HOW OFTEN DO YOU GET TOGETHER WITH FRIENDS OR RELATIVES?: ONCE A WEEK
ARE YOU MARRIED, WIDOWED, DIVORCED, SEPARATED, NEVER MARRIED, OR LIVING WITH A PARTNER?: LIVING WITH PARTNER
IN A TYPICAL WEEK, HOW MANY TIMES DO YOU TALK ON THE PHONE WITH FAMILY, FRIENDS, OR NEIGHBORS?: MORE THAN THREE TIMES A WEEK

## 2022-05-26 NOTE — PATIENT INSTRUCTIONS
Hold ibuprofen for 7 days prior to surgery.     Schedule COVID test on MyChart - double check with surgeon's office regarding timing, usually the prefer 4-7 days prior to surgery.     Send in colon cancer screen through [facility].

## 2022-05-26 NOTE — PROGRESS NOTES
[facility]  [address]  [address]  [place] [address]  Phone: [phone]  Fax: [phone]  Primary Provider: Nakia Santoro      {Provider  Link to PREOP SmartSet  Use this to apply standard patient instructions to AVS; includes medication directions, common orders, guidelines for anemia, warfarin, additional testing   :[id number]}  PREOPERATIVE EVALUATION:  Today's date: 5/26/2022    Rocael Borden is a 47 year old male who presents for a preoperative evaluation.    Surgical Information:  Surgery/Procedure: [facility] - Bicep tendon repair left side.    Surgery Location: [place]   Surgeon: DR. Myers  Surgery Date: 06/07/2022  Time of Surgery: 0945am  Where patient plans to recover: At home with family  Fax number for surgical facility: unknown    Type of Anesthesia Anticipated: General    Assessment & Plan     The proposed surgical procedure is considered INTERMEDIATE risk.    Pre-op exam  Patient medically optimized to proceed with surgery as outlined below. Will confirm timing for pre-operative COVID testing with his surgeon's office.   - Asymptomatic COVID-19 Virus (Coronavirus) by PCR; Future    Biceps tendon tear  Planning for operative repair as above.     Colon cancer screening  - COLOGUARD([employer] SCIENC[employer])         Risks and Recommendations:  The patient has the following additional risks and recommendations for perioperative complications:   - No identified additional risk factors other than previously addressed    Medication Instructions:  Patient is to take all scheduled medications on the day of surgery EXCEPT for modifications listed below:   - ibuprofen (Advil, Motrin): HOLD 7 days before surgery.     RECOMMENDATION:  APPROVAL GIVEN to proceed with proposed procedure, without further diagnostic evaluation.          Subjective     HPI related to upcoming procedure: Tore left bicep tendon while taking down gymnastics equipment, needs surgical repair.     Preop  Questions 5/26/2022   1. Have you ever had a heart attack or stroke? No   2. Have you ever had surgery on your heart or blood vessels, such as a stent placement, a coronary artery bypass, or surgery on an artery in your head, neck, heart, or legs? No   3. Do you have chest pain with activity? No   4. Do you have a history of  heart failure? No   5. Do you currently have a cold, bronchitis or symptoms of other infection? No   6. Do you have a cough, shortness of breath, or wheezing? No   7. Do you or anyone in your family have previous history of blood clots? No   8. Do you or does anyone in your family have a serious bleeding problem such as prolonged bleeding following surgeries or cuts? YES - father on blood thinners which causes easy bleeding.    9. Have you ever had problems with anemia or been told to take iron pills? No   10. Have you had any abnormal blood loss such as black, tarry or bloody stools? No   11. Have you ever had a blood transfusion? No   12. Are you willing to have a blood transfusion if it is medically needed before, during, or after your surgery? Yes   13. Have you or any of your relatives ever had problems with anesthesia? No   14. Do you have sleep apnea, excessive snoring or daytime drowsiness? No   15. Do you have any artifical heart valves or other implanted medical devices like a pacemaker, defibrillator, or continuous glucose monitor? No   16. Do you have artificial joints? No   17. Are you allergic to latex? No       Health Care Directive:  Patient does not have a Health Care Directive or Living Will: Discussed advance care planning with patient; however, patient declined at this time.    Preoperative Review of :   reviewed - no record of controlled substances prescribed.      Status of Chronic Conditions:  See problem list for active medical problems.  Problems all longstanding and stable, except as noted/documented.  See ROS for pertinent symptoms related to these  conditions.      Review of Systems  Constitutional, neuro, ENT, endocrine, pulmonary, cardiac, gastrointestinal, genitourinary, musculoskeletal, integument and psychiatric systems are negative, except as otherwise noted.    Patient Active Problem List    Diagnosis Date Noted     Impingement syndrome, shoulder, right 07/24/2018     Priority: Medium     Acute pain of right shoulder 07/24/2018     Priority: Medium     Erectile dysfunction, unspecified erectile dysfunction type 01/11/2016     Priority: Medium     Hyperlipidemia LDL goal <160 03/14/2012     Priority: Medium     Obesity (BMI 30-39.9) 03/14/2012     Priority: Medium     Lentigo 03/14/2012     Priority: Medium     Knee pain 03/14/2012     Priority: Medium     Skin Tags 06/25/2010     Priority: Medium      Past Medical History:   Diagnosis Date     Erectile dysfunction, unspecified erectile dysfunction type 1/11/2016     NO ACTIVE PROBLEMS      Past Surgical History:   Procedure Laterality Date     NO HISTORY OF SURGERY       Current Outpatient Medications   Medication Sig Dispense Refill     tamsulosin (FLOMAX) 0.4 MG capsule Take 1 capsule (0.4 mg) by mouth daily At onset of flank pain for kidney stone (Patient not taking: Reported on 5/26/2022) 20 capsule 1       Allergies   Allergen Reactions     Pcn [Penicillins] Swelling        Social History     Tobacco Use     Smoking status: Current Some Day Smoker     Types: Cigarettes     Smokeless tobacco: Former User     Types: Chew   Substance Use Topics     Alcohol use: Yes     Alcohol/week: 0.0 standard drinks       History   Drug Use No         Objective     /88 (BP Location: Right arm, Patient Position: Sitting, Cuff Size: Adult Large)   Pulse 76   Temp 97.9  F (36.6  C) (Tympanic)   Resp 16   Wt 111.6 kg (246 lb)   SpO2 96%   BMI 32.02 kg/m      Physical Exam    GENERAL APPEARANCE: healthy, alert and no distress     EYES: EOMI,  PERRL     HENT: ear canals and TM's normal and nose and mouth  without ulcers or lesions     NECK: no adenopathy, no asymmetry, masses, or scars and thyroid normal to palpation     RESP: lungs clear to auscultation - no rales, rhonchi or wheezes     CV: regular rates and rhythm, normal S1 S2, no S3 or S4 and no murmur, click or rub     ABDOMEN:  soft, nontender, no HSM or masses and bowel sounds normal     MS: extremities normal- no gross deformities noted, no evidence of inflammation in joints, FROM in all extremities.     SKIN: no suspicious lesions or rashes     NEURO: Normal strength and tone, sensory exam grossly normal, mentation intact and speech normal     PSYCH: mentation appears normal. and affect normal/bright     LYMPHATICS: No cervical adenopathy    Recent Labs   Lab Test 09/16/21  1044 08/20/20  1135    139   POTASSIUM 4.2 4.2   CR 0.79 0.72   A1C  --  5.4        Diagnostics:  No labs were ordered during this visit.   No EKG required, no history of coronary heart disease, significant arrhythmia, peripheral arterial disease or other structural heart disease.    Revised Cardiac Risk Index (RCRI):  The patient has the following serious cardiovascular risks for perioperative complications:   - No serious cardiac risks = 0 points     RCRI Interpretation: 0 points: Class I (very low risk - 0.4% complication rate)           Signed Electronically by: Elsa Cohen MD  Copy of this evaluation report is provided to requesting physician.

## 2022-08-18 LAB — NONINV COLON CA DNA+OCC BLD SCRN STL QL: NEGATIVE

## 2022-10-30 ENCOUNTER — HEALTH MAINTENANCE LETTER (OUTPATIENT)
Age: 47
End: 2022-10-30

## 2022-12-15 ENCOUNTER — OFFICE VISIT (OUTPATIENT)
Dept: PEDIATRICS | Facility: CLINIC | Age: 47
End: 2022-12-15
Payer: COMMERCIAL

## 2022-12-15 VITALS
TEMPERATURE: 97.4 F | BODY MASS INDEX: 30.76 KG/M2 | SYSTOLIC BLOOD PRESSURE: 122 MMHG | HEART RATE: 71 BPM | WEIGHT: 239.7 LBS | DIASTOLIC BLOOD PRESSURE: 64 MMHG | OXYGEN SATURATION: 97 % | HEIGHT: 74 IN | RESPIRATION RATE: 14 BRPM

## 2022-12-15 DIAGNOSIS — K59.00 CONSTIPATION, UNSPECIFIED CONSTIPATION TYPE: ICD-10-CM

## 2022-12-15 DIAGNOSIS — Z00.00 ROUTINE GENERAL MEDICAL EXAMINATION AT A HEALTH CARE FACILITY: Primary | ICD-10-CM

## 2022-12-15 DIAGNOSIS — R63.4 LOSS OF WEIGHT: ICD-10-CM

## 2022-12-15 DIAGNOSIS — K76.0 FATTY LIVER: ICD-10-CM

## 2022-12-15 DIAGNOSIS — H93.13 TINNITUS, BILATERAL: ICD-10-CM

## 2022-12-15 DIAGNOSIS — R79.89 ELEVATED LFTS: ICD-10-CM

## 2022-12-15 DIAGNOSIS — Z12.11 ENCOUNTER FOR SCREENING COLONOSCOPY: ICD-10-CM

## 2022-12-15 DIAGNOSIS — Z23 NEED FOR VACCINATION: ICD-10-CM

## 2022-12-15 DIAGNOSIS — H91.93 HEARING DIFFICULTY OF BOTH EARS: ICD-10-CM

## 2022-12-15 DIAGNOSIS — E78.5 HYPERLIPIDEMIA LDL GOAL <160: ICD-10-CM

## 2022-12-15 LAB
ALBUMIN SERPL BCG-MCNC: 4.8 G/DL (ref 3.5–5.2)
ALP SERPL-CCNC: 87 U/L (ref 40–129)
ALT SERPL W P-5'-P-CCNC: 312 U/L (ref 10–50)
ANION GAP SERPL CALCULATED.3IONS-SCNC: 11 MMOL/L (ref 7–15)
AST SERPL W P-5'-P-CCNC: 177 U/L (ref 10–50)
BILIRUB DIRECT SERPL-MCNC: <0.2 MG/DL (ref 0–0.3)
BILIRUB SERPL-MCNC: 0.8 MG/DL
BUN SERPL-MCNC: 10.7 MG/DL (ref 6–20)
CALCIUM SERPL-MCNC: 9.9 MG/DL (ref 8.6–10)
CHLORIDE SERPL-SCNC: 105 MMOL/L (ref 98–107)
CHOLEST SERPL-MCNC: 250 MG/DL
CREAT SERPL-MCNC: 0.68 MG/DL (ref 0.67–1.17)
DEPRECATED HCO3 PLAS-SCNC: 26 MMOL/L (ref 22–29)
GFR SERPL CREATININE-BSD FRML MDRD: >90 ML/MIN/1.73M2
GLUCOSE SERPL-MCNC: 97 MG/DL (ref 70–99)
HDLC SERPL-MCNC: 71 MG/DL
LDLC SERPL CALC-MCNC: 151 MG/DL
NONHDLC SERPL-MCNC: 179 MG/DL
POTASSIUM SERPL-SCNC: 4.4 MMOL/L (ref 3.4–5.3)
PROT SERPL-MCNC: 7.5 G/DL (ref 6.4–8.3)
SODIUM SERPL-SCNC: 142 MMOL/L (ref 136–145)
TRIGL SERPL-MCNC: 138 MG/DL

## 2022-12-15 PROCEDURE — 82248 BILIRUBIN DIRECT: CPT | Performed by: NURSE PRACTITIONER

## 2022-12-15 PROCEDURE — 91312 COVID-19 VACCINE BIVALENT BOOSTER 12+ (PFIZER): CPT | Performed by: NURSE PRACTITIONER

## 2022-12-15 PROCEDURE — 99214 OFFICE O/P EST MOD 30 MIN: CPT | Mod: 25 | Performed by: NURSE PRACTITIONER

## 2022-12-15 PROCEDURE — 90686 IIV4 VACC NO PRSV 0.5 ML IM: CPT | Performed by: NURSE PRACTITIONER

## 2022-12-15 PROCEDURE — 90471 IMMUNIZATION ADMIN: CPT | Performed by: NURSE PRACTITIONER

## 2022-12-15 PROCEDURE — 36415 COLL VENOUS BLD VENIPUNCTURE: CPT | Performed by: NURSE PRACTITIONER

## 2022-12-15 PROCEDURE — 99396 PREV VISIT EST AGE 40-64: CPT | Mod: 25 | Performed by: NURSE PRACTITIONER

## 2022-12-15 PROCEDURE — 80053 COMPREHEN METABOLIC PANEL: CPT | Performed by: NURSE PRACTITIONER

## 2022-12-15 PROCEDURE — 0124A COVID-19 VACCINE BIVALENT BOOSTER 12+ (PFIZER): CPT | Performed by: NURSE PRACTITIONER

## 2022-12-15 PROCEDURE — 80061 LIPID PANEL: CPT | Performed by: NURSE PRACTITIONER

## 2022-12-15 ASSESSMENT — ENCOUNTER SYMPTOMS
DYSURIA: 0
CHILLS: 0
WEAKNESS: 0
PALPITATIONS: 0
ABDOMINAL PAIN: 0
HEADACHES: 0
COUGH: 0
EYE PAIN: 0
SORE THROAT: 0
NAUSEA: 0
HEARTBURN: 0
DIARRHEA: 0
MYALGIAS: 1
DIZZINESS: 0
HEMATURIA: 0
FREQUENCY: 0
NERVOUS/ANXIOUS: 0
FEVER: 0
PARESTHESIAS: 0
HEMATOCHEZIA: 0
JOINT SWELLING: 0
CONSTIPATION: 1
SHORTNESS OF BREATH: 0
ARTHRALGIAS: 0

## 2022-12-15 NOTE — PATIENT INSTRUCTIONS
Calcium oxalate:        Preventive Health Recommendations  Male Ages 40 to 49    Yearly exam:             See your health care provider every year in order to  o   Review health changes.   o   Discuss preventive care.    o   Review your medicines if your doctor has prescribed any.  You should be tested each year for STDs (sexually transmitted diseases) if you re at risk.   Have a cholesterol test every 5 years.   Have a colonoscopy (test for colon cancer) if someone in your family has had colon cancer or polyps before age 50.   After age 45, have a diabetes test (fasting glucose). If you are at risk for diabetes, you should have this test every 3 years.    Talk with your health care provider about whether or not a prostate cancer screening test (PSA) is right for you.    Shots: Get a flu shot each year. Get a tetanus shot every 10 years.     Nutrition:  Eat at least 5 servings of fruits and vegetables daily.   Eat whole-grain bread, whole-wheat pasta and brown rice instead of white grains and rice.   Get adequate Calcium and Vitamin D.     Lifestyle  Exercise for at least 150 minutes a week (30 minutes a day, 5 days a week). This will help you control your weight and prevent disease.   Limit alcohol to one drink per day.   No smoking.   Wear sunscreen to prevent skin cancer.   See your dentist every six months for an exam and cleaning.

## 2022-12-15 NOTE — PROGRESS NOTES
SUBJECTIVE:   CC: Rocael is an 47 year old who presents for preventative health visit.     Patient has been advised of split billing requirements and indicates understanding: Yes     Healthy Habits:     Getting at least 3 servings of Calcium per day:  Yes    Bi-annual eye exam:  NO    Dental care twice a year:  Yes    Sleep apnea or symptoms of sleep apnea:  None    Diet:  Regular (no restrictions)    Frequency of exercise:  1 day/week    Duration of exercise:  15-30 minutes    Taking medications regularly:  Yes    Medication side effects:  None    PHQ-2 Total Score: 0    Additional concerns today:  No    Eye exam: every 3 years. No issues.    Today's PHQ-2 Score:   PHQ-2 ( 1999 Pfizer) 12/15/2022   Q1: Little interest or pleasure in doing things 0   Q2: Feeling down, depressed or hopeless 0   PHQ-2 Score 0   PHQ-2 Total Score (12-17 Years)- Positive if 3 or more points; Administer PHQ-A if positive -   Q1: Little interest or pleasure in doing things Not at all   Q2: Feeling down, depressed or hopeless Not at all   PHQ-2 Score 0     Social History     Tobacco Use     Smoking status: Some Days     Types: Cigarettes     Smokeless tobacco: Former     Types: Chew   Substance Use Topics     Alcohol use: Yes     Alcohol/week: 0.0 standard drinks       Alcohol Use 12/15/2022   Prescreen: >3 drinks/day or >7 drinks/week? Yes   Prescreen: >3 drinks/day or >7 drinks/week? -   AUDIT SCORE  5   Alcohol use: x4 weekly usually 2-3 drinks per sitting.    Last PSA:   PSA   Date Value Ref Range Status   04/11/2017 1.42 0 - 4 ug/L Final     Comment:     Assay Method:  Chemiluminescence using Siemens Vista analyzer   No family history prostate cacner    Colonoscopy:no history.  Cologuard 8/2022 negative.   No history of colon cancer.  Father had diverticulosis.    Kidney stones:  -Frequent history  -Last one 3 months ago.  -Does have Flomax to take when he feels like he has one.  -Does not follow calcium oxalate diet.    Elevated  LFT:  -Persistent elevated LFT  -Hepatitis screening negative  -US showed fatty liver  -Did follow-up with [facility] and recommended Fibro-Scan but patient declined.  -Has lost weight and changed diet.    Hearing loss:  -Chronic.  Was tested 5 years ago and has hard time hearing high pitched noises in bilateral ears.    Constipation:  -Intermittent.  Metamucil gummy daily.  -Water intake is adequate.  -Fiber intake adequate.    -Was doing Optavia Diet.    History of bicep tendon repair.  Followed by [facility].  Received steroid injection in wrist for tendonitis.     Wt Readings from Last 4 Encounters:   12/15/22 108.7 kg (239 lb 11.2 oz)   05/26/22 111.6 kg (246 lb)   09/16/21 115.1 kg (253 lb 12.8 oz)   08/20/20 115.5 kg (254 lb 11.2 oz)     Reviewed orders with patient. Reviewed health maintenance and updated orders accordingly - Yes  BP Readings from Last 3 Encounters:   12/15/22 122/64   05/26/22 128/88   09/16/21 120/82    Wt Readings from Last 3 Encounters:   12/15/22 108.7 kg (239 lb 11.2 oz)   05/26/22 111.6 kg (246 lb)   09/16/21 115.1 kg (253 lb 12.8 oz)            Patient Active Problem List   Diagnosis     Skin Tags     Hyperlipidemia LDL goal <160     Obesity (BMI 30-39.9)     Lentigo     Knee pain     Erectile dysfunction, unspecified erectile dysfunction type     Impingement syndrome, shoulder, right     Acute pain of right shoulder     Past Surgical History:   Procedure Laterality Date     NO HISTORY OF SURGERY         Social History     Tobacco Use     Smoking status: Some Days     Types: Cigarettes     Smokeless tobacco: Former     Types: Chew   Substance Use Topics     Alcohol use: Yes     Alcohol/week: 0.0 standard drinks     Family History   Problem Relation Age of Onset     Diabetes Maternal Grandmother      C.A.D. No family hx of      Prostate Cancer No family hx of      Cancer - colorectal No family hx of          Current Outpatient Medications   Medication Sig Dispense Refill     tamsulosin (FLOMAX)  "0.4 MG capsule Take 1 capsule (0.4 mg) by mouth daily At onset of flank pain for kidney stone 20 capsule 1       Reviewed and updated as needed this visit by clinical staff   Tobacco  Allergies  Meds            Nhung Farris NP on 12/15/2022 at 10:03 AM    Reviewed and updated as needed this visit by Provider    Allergies  Meds               Review of Systems   Constitutional: Negative for chills and fever.   HENT: Positive for hearing loss. Negative for congestion, ear pain and sore throat.    Eyes: Negative for pain and visual disturbance.   Respiratory: Negative for cough and shortness of breath.    Cardiovascular: Negative for chest pain, palpitations and peripheral edema.   Gastrointestinal: Positive for constipation. Negative for abdominal pain, diarrhea, heartburn, hematochezia and nausea.   Genitourinary: Negative for dysuria, frequency, genital sores, hematuria, impotence, penile discharge and urgency.   Musculoskeletal: Positive for myalgias. Negative for arthralgias and joint swelling.   Skin: Negative for rash.   Neurological: Negative for dizziness, weakness, headaches and paresthesias.   Psychiatric/Behavioral: Negative for mood changes. The patient is not nervous/anxious.        OBJECTIVE:   /64 (BP Location: Right arm, Patient Position: Sitting, Cuff Size: Adult Large)   Pulse 71   Temp 97.4  F (36.3  C) (Tympanic)   Resp 14   Ht 1.867 m (6' 1.5\")   Wt 108.7 kg (239 lb 11.2 oz)   SpO2 97%   BMI 31.20 kg/m      Physical Exam  GENERAL: healthy, alert and no distress  EYES: Eyes grossly normal to inspection, PERRL and conjunctivae and sclerae normal  HENT: ear canals and TM's normal, nose and mouth without ulcers or lesions  NECK: no adenopathy, no asymmetry, masses, or scars and thyroid normal to palpation  RESP: lungs clear to auscultation - no rales, rhonchi or wheezes  CV: regular rate and rhythm, normal S1 S2, no S3 or S4, no murmur, click or rub, no peripheral edema and " peripheral pulses strong  ABDOMEN: soft, nontender, no hepatosplenomegaly, no masses and bowel sounds normal  MS: no gross musculoskeletal defects noted, no edema  NEURO: Normal strength and tone, mentation intact and speech normal  PSYCH: mentation appears normal, affect normal/bright      ASSESSMENT/PLAN:     Routine general medical examination at a health care facility  Routine exam performed today. Age appropriate screening and preventative care have been addressed today. Vaccinations have been updated. Recommend annual vision exams as well as biannual dental exams. They will follow up for annual physical again in one year.   - REVIEW OF HEALTH MAINTENANCE PROTOCOL ORDERS    Hyperlipidemia LDL goal <160  Has lost weight.  Will repeat lipid panel today.    - Lipid panel reflex to direct LDL Fasting  - Basic metabolic panel  (Ca, Cl, CO2, Creat, Gluc, K, Na, BUN)    Fatty liver  Repeat LFTs ordered.   - Hepatic panel (Albumin, ALT, AST, Bili, Alk Phos, TP)  - Basic metabolic panel  (Ca, Cl, CO2, Creat, Gluc, K, Na, BUN)    Elevated LFTs  - Hepatic panel (Albumin, ALT, AST, Bili, Alk Phos, TP)    Tinnitus, bilateral  Consult 4-5 years ago.  Would like repeat audiogram and consultation.  - Adult Audiology  Referral    Hearing difficulty of both ears  Consult 4-5 years ago.  Would like repeat audiogram and consultation.  - Adult Audiology  Referral    Need for vaccination  - INFLUENZA VACCINE IM > 6 MONTHS VALENT IIV4 (AFLURIA/FLUZONE)  - COVID-19,PF,PFIZER BOOSTER BIVALENT (12+YRS)    Encounter for screening colonoscopy  - Colonoscopy Screening  Referral    Loss of weight  Through Optavia diet.    Constipation, unspecified constipation type  Intermittent. Encouraged high fiber diet and adequate water intake.        COUNSELING:   Reviewed preventive health counseling, as reflected in patient instructions        He reports that he has been smoking cigarettes. He has quit using smokeless  tobacco.  His smokeless tobacco use included chew.  Nicotine/Tobacco Cessation Plan:   Information offered: Patient not interested at this time            Nhung Farris NP  [facility]

## 2023-01-06 ENCOUNTER — MYC MEDICAL ADVICE (OUTPATIENT)
Dept: PEDIATRICS | Facility: CLINIC | Age: 48
End: 2023-01-06

## 2023-01-06 ENCOUNTER — TELEPHONE (OUTPATIENT)
Dept: GASTROENTEROLOGY | Facility: CLINIC | Age: 48
End: 2023-01-06

## 2023-01-06 NOTE — TELEPHONE ENCOUNTER
".    Screening Questions  BLUE  KIND OF PREP RED  LOCATION [review exclusion criteria] GREEN  SEDATION TYPE        Y Are you active on mychart?       Nhung Farris Ordering/Referring Provider?        [facility] What type of coverage do you have?      N Have you had a positive covid test in the last 14 days?   31.5 1. BMI  [BMI 40+ - review exclusion criteria]    Y  2. Are you able to give consent for your medical care? [IF NO,RN REVIEW]          N  3. Are you taking any prescription pain medications on a routine schedule   (ex narcotics: tramadol, oxycodone, roxicodone, oxycontin,  and percocet)?        N  3a. EXTENDED PREP What kind of prescription?     N 4. Do you have any chemical dependencies such as alcohol, street drugs, or methadone?        **If yes 3- 5 , please schedule with MAC sedation.**          IF YES TO ANY 6 - 10 - HOSPITAL SETTING ONLY.     N 6.   Do you need assistance transferring?     N 7.   Have you had a heart or lung transplant?    N 8.   Are you currently on dialysis?   N 9.   Do you use daily home oxygen?   N 10. Do you take nitroglycerin?   10a.  If yes, how often?     11. [FEMALES]  N Are you currently pregnant?    11a. N If yes, how many weeks? [ Greater than 12 weeks, OR NEEDED]    N 12. Do you have Pulmonary Hypertension? *NEED PAC APPT AT [facility]*     N 13. [review exclusion criteria]  Do you have any implantable devices in your body (pacemaker, defib, LVAD)?    N 14. In the past 6 months, have you had any heart related issues including cardiomyopathy or heart attack?     14a. N If yes, did it require cardiac stenting if so when?     N 15. Have you had a stroke or Transient ischemic attack (TIA - aka  mini stroke ) within 6 months?      N 16. Do you have mod to severe Obstructive Sleep Apnea?  [Hospital only]    N 17. Do you have SEVERE AND UNCONTROLLED asthma? *NEED PAC APPT AT [facility]*     N 18. Are you currently taking any blood thinners?     18a. If yes, inform patient to \"follow up w/ " "ordering provider for bridging instructions.\"    N 19. Do you take the medication Phentermine?    19a. If yes, \"Hold for 7 days before procedure.  Please consult your prescribing provider if you have questions about holding this medication.\"     N  20. Do you have chronic kidney disease?      N  21. Do you have a diagnosis of diabetes?     N  22. On a regular basis do you go 3-5 days between bowel movements?     N 23. Preferred LOCAL Pharmacy for Pre Prescription    [ LIST ONLY ONE PHARMACY]     [facility] [id number] [place] [address] [address]        - CLOSING REMINDERS -    Informed patient they will need an adult    Cannot take any type of public or medical transportation alone    Conscious Sedation- Needs  for 6 hours after the procedure       MAC/General-Needs  for 24 hours after procedure    Pre-Procedure Covid test to be completed [[facility] PCR Testing Required]    Confirmed Nurse will call to complete assessment       - SCHEDULING DETAILS -  N Hospital Setting Required? If yes, what is the exclusion?: NEHAL HAWK  Surgeon    4/5/23  Date of Procedure  Lower Endoscopy [Colonoscopy]  Type of Procedure Scheduled  [facility] Location   [facility]-If you answer yes to questions #8, #20, #21Which Colonoscopy Prep was Sent?     MODERATE Sedation Type     N PAC / Pre-op Required                 "

## 2023-03-08 ENCOUNTER — TELEPHONE (OUTPATIENT)
Dept: GASTROENTEROLOGY | Facility: CLINIC | Age: 48
End: 2023-03-08
Payer: COMMERCIAL

## 2023-03-08 NOTE — TELEPHONE ENCOUNTER
Caller: Rocael  Reason for Reschedule/Cancellation (please be detailed, any staff messages or encounters to note?): Schedule conflict      Prior to reschedule please review:    Ordering Provider:FRANTZ QUINTEROS    Sedation per order: Moderate    Does patient have any ASC Exclusions, please identify?: No      Notes on Cancelled Procedure:    Procedure:Lower Endoscopy [Colonoscopy]     Date: 4/5/2023    Location:[facility]; [facility] [address] [place] [address]    Surgeon: Keith        Rescheduled: Yes    Procedure: Lower Endoscopy [Colonoscopy]     Date: 6/1/2023    Location:[facility]; [address] [place] [address]    Surgeon: Haley    Sedation Level Scheduled  Moderate,  Reason for Sedation Level Order    Prep/Instructions updated and sent: Mason

## 2023-03-15 ENCOUNTER — OFFICE VISIT (OUTPATIENT)
Dept: AUDIOLOGY | Facility: CLINIC | Age: 48
End: 2023-03-15
Payer: COMMERCIAL

## 2023-03-15 DIAGNOSIS — H90.3 SENSORINEURAL HEARING LOSS, ASYMMETRICAL: Primary | ICD-10-CM

## 2023-03-15 DIAGNOSIS — H93.13 TINNITUS, BILATERAL: ICD-10-CM

## 2023-03-15 DIAGNOSIS — H91.93 HEARING DIFFICULTY OF BOTH EARS: ICD-10-CM

## 2023-03-15 PROCEDURE — 92565 STENGER TEST PURE TONE: CPT | Performed by: AUDIOLOGIST

## 2023-03-15 PROCEDURE — 92550 TYMPANOMETRY & REFLEX THRESH: CPT | Performed by: AUDIOLOGIST

## 2023-03-15 PROCEDURE — 92557 COMPREHENSIVE HEARING TEST: CPT | Performed by: AUDIOLOGIST

## 2023-03-15 NOTE — PROGRESS NOTES
AUDIOLOGY REPORT    SUBJECTIVE:  Rocael Borden is a 47 year old male who was seen in the Audiology Clinic at the [facility] for audiologic evaluation, referred by Nhung Farris N.P.. The patient reports a know bilateral high frequency hearing loss with the right ear worse than the left ear.  He reports constant bilateral tinnitus (described as high pitched ringing).  He also reports bilateral aural fullness today, he noted that it is from when he was flying on an air-plane about 5 days ago. The patient denies  bilateral otalgia, bilateral drainage and dizziness. No history of ear surgeries or noise exposure reported.  The patient notes difficulty with communication in a variety of listening situations, specifically hearing in background noise. He also notes that he needs to ask for repetition fairly frequently.    OBJECTIVE:    Fall Risk Screen:  1. Have you fallen two or more times in the past year? No  2. Have you fallen and had an injury in the past year? No    Timed Up and Go Score (in seconds): not tested  Is patient a fall risk? No  Referral initiated: No  Fall Risk Assessment Completed by Audiology    Otoscopic exam indicates ears are clear of cerumen bilaterally     Pure Tone Thresholds assessed using conventional audiometry with good  reliability from 250-8000 Hz bilaterally using insert earphones and circumaural headphones     RIGHT:  normal sloping to moderate-severe sensorineural hearing loss    LEFT:    normal sloping to moderate-severe sensorineural hearing loss  Significant ear difference noted at 2 kHz. Negative Lindsey.    Tympanogram:    RIGHT: normal eardrum mobility    LEFT:   normal eardrum mobility    Reflexes (reported by stimulus ear):  RIGHT: Ipsilateral is present at normal levels  RIGHT: Contralateral is absent at frequencies tested  LEFT:   Ipsilateral is present at elevated levels  LEFT:   Contralateral is absent at frequencies  tested    Speech Reception Threshold:    RIGHT: 15 dB HL    LEFT:   10 dB HL    Word Recognition Score:     RIGHT: 100% at 70 dB HL using NU-6 recorded word list.    LEFT:   100% at 65 dB HL using NU-6 recorded word list.      ASSESSMENT:     ICD-10-CM    1. Tinnitus, bilateral  H93.13 Adult Audiology  Referral      2. Hearing difficulty of both ears  H91.93 Adult Audiology  Referral           Today's results revealed a bilateral asymmetric sensorineural hearing loss. Today s results were discussed with the patient in detail.     PLAN:  Patient was counseled regarding hearing loss and impact on communication.  Patient is a good candidate for amplification at this time. It is recommended that the patient repeat the hearing evaluation every 1-2 years, sooner if concerns arise.  A follow-up with ENT regarding the significant ear difference and a hearing aid trial was also discussed and recommended. Patient was agreeable to an ENT follow-up and was interested in pursuing hearing aids at this time. The patient was scheduled accordingly prior to him leaving the clinic today.  Please call this clinic with questions regarding these results or recommendations.        Isabelle Barajas  Audiologist  [place] License  #[id number]

## 2023-05-16 RX ORDER — BISACODYL 5 MG/1
TABLET, DELAYED RELEASE ORAL
Qty: 4 TABLET | Refills: 0 | Status: SHIPPED | OUTPATIENT
Start: 2023-05-16 | End: 2024-05-22

## 2023-05-16 RX ORDER — BISACODYL 5 MG/1
TABLET, DELAYED RELEASE ORAL
Qty: 4 TABLET | Refills: 0 | Status: SHIPPED | OUTPATIENT
Start: 2023-05-16 | End: 2023-05-16

## 2023-05-31 ENCOUNTER — OFFICE VISIT (OUTPATIENT)
Dept: OTOLARYNGOLOGY | Facility: CLINIC | Age: 48
End: 2023-05-31
Payer: COMMERCIAL

## 2023-05-31 ENCOUNTER — OFFICE VISIT (OUTPATIENT)
Dept: AUDIOLOGY | Facility: CLINIC | Age: 48
End: 2023-05-31
Payer: COMMERCIAL

## 2023-05-31 VITALS
SYSTOLIC BLOOD PRESSURE: 147 MMHG | WEIGHT: 245 LBS | DIASTOLIC BLOOD PRESSURE: 97 MMHG | HEIGHT: 74 IN | BODY MASS INDEX: 31.44 KG/M2 | HEART RATE: 89 BPM

## 2023-05-31 DIAGNOSIS — H90.3 ASYMMETRICAL SENSORINEURAL HEARING LOSS: ICD-10-CM

## 2023-05-31 DIAGNOSIS — H90.3 BILATERAL SENSORINEURAL HEARING LOSS: Primary | ICD-10-CM

## 2023-05-31 DIAGNOSIS — H90.3 SENSORINEURAL HEARING LOSS, ASYMMETRICAL: Primary | ICD-10-CM

## 2023-05-31 PROCEDURE — 99203 OFFICE O/P NEW LOW 30 MIN: CPT | Performed by: REGISTERED NURSE

## 2023-05-31 PROCEDURE — 92591 PR HEARING AID EXAM BINAURAL: CPT | Performed by: AUDIOLOGIST

## 2023-05-31 ASSESSMENT — PAIN SCALES - GENERAL: PAINLEVEL: NO PAIN (0)

## 2023-05-31 NOTE — PROGRESS NOTES
0-RAUDIOLOGY REPORT    SUBJECTIVE: Rocael Borden is a 48 year old male was seen in the Audiology Clinic at  [facility] on 5/31/23 to discuss concerns with hearing and functional communication difficulties.  Rocael has been seen previously on 03/15/23, and results revealed a normals, steeply sloping to primarily moderately-severe sensorineural hearing loss bilaterally .  The patient was medically evaluated and determined to be cleared for binaural hearing aids by Jennyfer Ellis CNP. Rocael notes difficulty with communication in a variety of listening situations. He has not worn hearing aids int he past, and has an android phone.     OBJECTIVE:  Patient is a hearing aid candidate. Patient would like to move forward with a hearing aid evaluation today. Therefore, the patient was presented with different options for amplification to help aid in communication. Discussed styles, levels of technology and monaural vs. binaural fitting.     The hearing aid(s) mutually chosen were:  Binaural: Phonak Audeo L-90-R  COLOR: Graphite p7  BATTERY SIZE: rechargeable  EARMOLD/TIPS: medium open  CANAL/ LENGTH: 1  FREE ACCESSORY: Partner mark    ASSESSMENT:   Reviewed purchase information and warranty information with patient. The 45 day trial period was explained to patient. The patient was given a copy of the [place] Department of [employer] consumer brochure on purchasing hearing instruments. Patient risk factors have been provided to the patient in writing prior to the sale of the hearing aid per FDA regulation. The risk factors are also available in the User Instructional Booklet to be presented on the day of the hearing aid fitting. Hearing aid(s) ordered. Hearing aid evaluation completed.    PLAN: Rocael is scheduled to return in 2-3 weeks for a hearing aid fitting and programming. Purchase agreement will be completed on that date. Please contact this clinic with any questions or  concerns.        Selma Drummond, [facility]  Licensed Audiologist  [place] #[id number]

## 2023-05-31 NOTE — NURSING NOTE
"Chief Complaint   Patient presents with     RECHECK     Hearing aid clearance       Blood pressure (!) 147/97, pulse 89, height 1.88 m (6' 2\"), weight 111.1 kg (245 lb).    Mely Vogel, EMT    "

## 2023-05-31 NOTE — LETTER
5/31/2023       RE: Rocael Borden  [address] [facility] [address]     Dear Colleague,    Thank you for referring your patient, Rocael Borden, to the [facility] [place] at [facility]. Please see a copy of my visit note below.      Otolaryngology Clinic  May 31, 2023    Chief Complaint:   Bilateral sensorineural hearing loss       History of Present Illness:   Rocael Borden is a 48 year old male who presents today for medical clearance for hearing aids.  Patient reports a longstanding history of bilateral hearing loss.  Has noted tinnitus bilaterally for a very long time.  Does notice that right hearing is worse than left.  Patient states he had an audiogram done 10 to 15 years ago that showed significant high-frequency hearing loss with right worse than left.  Patient reports an increased difficulty with hearing conversations especially in crowded/noisy environments.  Feels that he has to ask for repetition frequently.    Patient denies any otalgia, otorrhea, dizziness, facial numbness/weakness, history of frequent ear infections, or ear surgeries.  Family history of hearing loss with father utilizing hearing aids.    Past Medical History:  Past Medical History:   Diagnosis Date    Erectile dysfunction, unspecified erectile dysfunction type 1/11/2016    NO ACTIVE PROBLEMS        Past Surgical History:  Past Surgical History:   Procedure Laterality Date    NO HISTORY OF SURGERY         Medications:  Current Outpatient Medications   Medication Sig Dispense Refill    bisacodyl (DULCOLAX) 5 MG EC tablet Take 2 tablets at 3 pm the day before your procedure. If your procedure is before 11 am, take 2 additional tablets at 11 pm. If your procedure is after 11 am, take 2 additional tablets at 6 am. For additional instructions refer to your colonoscopy prep instructions. 4 tablet 0    polyethylene glycol (GOLYTELY) 236 g suspension The night  "before the exam at 6 pm drink an 8-ounce glass every 15 minutes until the jug is half empty. If you arrive before 11 AM: Drink the other half of the Golytely jug at 11 PM night before procedure. If you arrive after 11 AM: Drink the other half of the Golytely jug at 6 AM day of procedure. For additional instructions refer to your colonoscopy prep instructions. 4000 mL 0    tamsulosin (FLOMAX) 0.4 MG capsule Take 1 capsule (0.4 mg) by mouth daily At onset of flank pain for kidney stone 20 capsule 1       Allergies:  Allergies   Allergen Reactions    Pcn [Penicillins] Swelling        Social History:  Social History     Tobacco Use    Smoking status: Some Days     Types: Cigarettes    Smokeless tobacco: Former     Types: Chew   Vaping Use    Vaping status: Never Used   Substance Use Topics    Alcohol use: Yes     Alcohol/week: 0.0 standard drinks of alcohol    Drug use: No       ROS: 10 point ROS neg other than the symptoms noted above in the HPI.    Physical Exam:    BP (!) 147/97 (BP Location: Right arm, Patient Position: Sitting, Cuff Size: Adult Large)   Pulse 89   Ht 1.88 m (6' 2\")   Wt 111.1 kg (245 lb)   BMI 31.46 kg/m       Constitutional:  The patient was unaccompanied, well-groomed, and in no acute distress.     Neurologic: Alert and oriented x 3.  CN's III-XII within normal limits.  Voice normal.   Psychiatric: The patient's affect was calm, cooperative, and appropriate.    Communication:  Normal; communicates verbally, normal voice quality.    Respiratory: Breathing comfortably without stridor or exertion of accessory muscles.    Ears: Pinnae and tragus non-tender.  EAC's and TM's were clear.      Audiogram: 3/15/2023 - data independently reviewed  Normal steeply sloping to moderately severe sensorineural hearing loss bilaterally (45 dB asymmetry at 2000 Hz with right worse than left)  % bilaterally  Acoustic Reflexes: Absent in right contra and left contra.  Present in other " conditions.  Tympanograms: type A bilaterally    Assessment and Plan:  1. Bilateral sensorineural hearing loss  Patient with bilateral sensorineural hearing loss. Reviewed audiogram with patient today. Patient is a hearing aid candidate bilaterally and is medically cleared for hearing aids. Discussed with patient that there is a trial period prior to committing to purchase hearing aids. Patient can use this trial to determine if hearing aid benefit would be worth the cost of these devices. Patient will proceed with hearing aid consult scheduled after this appointment. Patient is medically cleared for hearing aids.    2. Asymmetrical sensorineural hearing loss  Patient with significant asymmetric hearing loss at 2000 Hz with right worse than left.  Discussed obtaining an MRI to rule out a retrocochlear lesion.  Patient states that he believes his hearing with the right worse than left has been stable for over 10 years.  Will monitor with a repeat audiogram in 1 to 2 years.    Jennyfer Ellis DNP, APRN, CNP  Otolaryngology  Head & Neck Surgery  [phone]    30 minutes spent by me on the date of the encounter doing chart review, history and exam, documentation and further activities per the note        Again, thank you for allowing me to participate in the care of your patient.      Sincerely,    Lesia Ellis, NP

## 2023-05-31 NOTE — PROGRESS NOTES
Otolaryngology Clinic  May 31, 2023    Chief Complaint:   Bilateral sensorineural hearing loss       History of Present Illness:   Rocael Borden is a 48 year old male who presents today for medical clearance for hearing aids.  Patient reports a longstanding history of bilateral hearing loss.  Has noted tinnitus bilaterally for a very long time.  Does notice that right hearing is worse than left.  Patient states he had an audiogram done 10 to 15 years ago that showed significant high-frequency hearing loss with right worse than left.  Patient reports an increased difficulty with hearing conversations especially in crowded/noisy environments.  Feels that he has to ask for repetition frequently.    Patient denies any otalgia, otorrhea, dizziness, facial numbness/weakness, history of frequent ear infections, or ear surgeries.  Family history of hearing loss with father utilizing hearing aids.    Past Medical History:  Past Medical History:   Diagnosis Date     Erectile dysfunction, unspecified erectile dysfunction type 1/11/2016     NO ACTIVE PROBLEMS        Past Surgical History:  Past Surgical History:   Procedure Laterality Date     NO HISTORY OF SURGERY         Medications:  Current Outpatient Medications   Medication Sig Dispense Refill     bisacodyl (DULCOLAX) 5 MG EC tablet Take 2 tablets at 3 pm the day before your procedure. If your procedure is before 11 am, take 2 additional tablets at 11 pm. If your procedure is after 11 am, take 2 additional tablets at 6 am. For additional instructions refer to your colonoscopy prep instructions. 4 tablet 0     polyethylene glycol (GOLYTELY) 236 g suspension The night before the exam at 6 pm drink an 8-ounce glass every 15 minutes until the jug is half empty. If you arrive before 11 AM: Drink the other half of the Golytely jug at 11 PM night before procedure. If you arrive after 11 AM: Drink the other half of the Golytely jug at 6 AM day of procedure. For additional  "instructions refer to your colonoscopy prep instructions. 4000 mL 0     tamsulosin (FLOMAX) 0.4 MG capsule Take 1 capsule (0.4 mg) by mouth daily At onset of flank pain for kidney stone 20 capsule 1       Allergies:  Allergies   Allergen Reactions     Pcn [Penicillins] Swelling        Social History:  Social History     Tobacco Use     Smoking status: Some Days     Types: Cigarettes     Smokeless tobacco: Former     Types: Chew   Vaping Use     Vaping status: Never Used   Substance Use Topics     Alcohol use: Yes     Alcohol/week: 0.0 standard drinks of alcohol     Drug use: No       ROS: 10 point ROS neg other than the symptoms noted above in the HPI.    Physical Exam:    BP (!) 147/97 (BP Location: Right arm, Patient Position: Sitting, Cuff Size: Adult Large)   Pulse 89   Ht 1.88 m (6' 2\")   Wt 111.1 kg (245 lb)   BMI 31.46 kg/m       Constitutional:  The patient was unaccompanied, well-groomed, and in no acute distress.     Neurologic: Alert and oriented x 3.  CN's III-XII within normal limits.  Voice normal.   Psychiatric: The patient's affect was calm, cooperative, and appropriate.    Communication:  Normal; communicates verbally, normal voice quality.    Respiratory: Breathing comfortably without stridor or exertion of accessory muscles.    Ears: Pinnae and tragus non-tender.  EAC's and TM's were clear.      Audiogram: 3/15/2023 - data independently reviewed  Normal steeply sloping to moderately severe sensorineural hearing loss bilaterally (45 dB asymmetry at 2000 Hz with right worse than left)  % bilaterally  Acoustic Reflexes: Absent in right contra and left contra.  Present in other conditions.  Tympanograms: type A bilaterally    Assessment and Plan:  1. Bilateral sensorineural hearing loss  Patient with bilateral sensorineural hearing loss. Reviewed audiogram with patient today. Patient is a hearing aid candidate bilaterally and is medically cleared for hearing aids. Discussed with patient that " there is a trial period prior to committing to purchase hearing aids. Patient can use this trial to determine if hearing aid benefit would be worth the cost of these devices. Patient will proceed with hearing aid consult scheduled after this appointment. Patient is medically cleared for hearing aids.    2. Asymmetrical sensorineural hearing loss  Patient with significant asymmetric hearing loss at 2000 Hz with right worse than left.  Discussed obtaining an MRI to rule out a retrocochlear lesion.  Patient states that he believes his hearing with the right worse than left has been stable for over 10 years.  Will monitor with a repeat audiogram in 1 to 2 years.    Jennyfer Ellis DNP, APRN, CNP  Otolaryngology  Head & Neck Surgery  [phone]    30 minutes spent by me on the date of the encounter doing chart review, history and exam, documentation and further activities per the note

## 2023-06-01 ENCOUNTER — HOSPITAL ENCOUNTER (OUTPATIENT)
Facility: CLINIC | Age: 48
Discharge: HOME | End: 2023-06-01
Attending: INTERNAL MEDICINE | Admitting: INTERNAL MEDICINE
Payer: COMMERCIAL

## 2023-06-01 VITALS
RESPIRATION RATE: 16 BRPM | HEART RATE: 67 BPM | BODY MASS INDEX: 31.18 KG/M2 | OXYGEN SATURATION: 97 % | HEIGHT: 74 IN | TEMPERATURE: 97.3 F | WEIGHT: 243 LBS | DIASTOLIC BLOOD PRESSURE: 108 MMHG | SYSTOLIC BLOOD PRESSURE: 159 MMHG

## 2023-06-01 DIAGNOSIS — Z12.11 COLON CANCER SCREENING: Primary | ICD-10-CM

## 2023-06-01 RX ORDER — SIMETHICONE 40MG/0.6ML
133 SUSPENSION, DROPS(FINAL DOSAGE FORM)(ML) ORAL
Status: DISCONTINUED | OUTPATIENT
Start: 2023-06-01 | End: 2023-06-01 | Stop reason: HOSPADM

## 2023-06-01 RX ORDER — FLUMAZENIL 0.1 MG/ML
0.2 INJECTION, SOLUTION INTRAVENOUS
Status: DISCONTINUED | OUTPATIENT
Start: 2023-06-01 | End: 2023-06-01 | Stop reason: HOSPADM

## 2023-06-01 RX ORDER — NALOXONE HYDROCHLORIDE 0.4 MG/ML
0.2 INJECTION, SOLUTION INTRAMUSCULAR; INTRAVENOUS; SUBCUTANEOUS
Status: DISCONTINUED | OUTPATIENT
Start: 2023-06-01 | End: 2023-06-01 | Stop reason: HOSPADM

## 2023-06-01 RX ORDER — FENTANYL CITRATE 50 UG/ML
50-100 INJECTION, SOLUTION INTRAMUSCULAR; INTRAVENOUS EVERY 5 MIN PRN
Status: DISCONTINUED | OUTPATIENT
Start: 2023-06-01 | End: 2023-06-01 | Stop reason: HOSPADM

## 2023-06-01 RX ORDER — ONDANSETRON 2 MG/ML
4 INJECTION INTRAMUSCULAR; INTRAVENOUS
Status: CANCELLED | OUTPATIENT
Start: 2023-06-01

## 2023-06-01 RX ORDER — NALOXONE HYDROCHLORIDE 0.4 MG/ML
0.4 INJECTION, SOLUTION INTRAMUSCULAR; INTRAVENOUS; SUBCUTANEOUS
Status: DISCONTINUED | OUTPATIENT
Start: 2023-06-01 | End: 2023-06-01 | Stop reason: HOSPADM

## 2023-06-01 RX ORDER — LIDOCAINE 40 MG/G
CREAM TOPICAL
Status: CANCELLED | OUTPATIENT
Start: 2023-06-01

## 2023-06-01 RX ORDER — EPINEPHRINE 1 MG/ML
0.1 INJECTION, SOLUTION INTRAMUSCULAR; SUBCUTANEOUS
Status: DISCONTINUED | OUTPATIENT
Start: 2023-06-01 | End: 2023-06-01 | Stop reason: HOSPADM

## 2023-06-01 RX ORDER — ATROPINE SULFATE 0.1 MG/ML
1 INJECTION INTRAVENOUS
Status: DISCONTINUED | OUTPATIENT
Start: 2023-06-01 | End: 2023-06-01 | Stop reason: HOSPADM

## 2023-06-01 RX ORDER — DIPHENHYDRAMINE HYDROCHLORIDE 50 MG/ML
25-50 INJECTION INTRAMUSCULAR; INTRAVENOUS
Status: DISCONTINUED | OUTPATIENT
Start: 2023-06-01 | End: 2023-06-01 | Stop reason: HOSPADM

## 2023-06-28 ENCOUNTER — TRANSFERRED RECORDS (OUTPATIENT)
Dept: HEALTH INFORMATION MANAGEMENT | Facility: CLINIC | Age: 48
End: 2023-06-28

## 2023-06-28 ENCOUNTER — OFFICE VISIT (OUTPATIENT)
Dept: AUDIOLOGY | Facility: CLINIC | Age: 48
End: 2023-06-28
Payer: COMMERCIAL

## 2023-06-28 DIAGNOSIS — H90.3 SENSORINEURAL HEARING LOSS, ASYMMETRICAL: Primary | ICD-10-CM

## 2023-06-28 PROCEDURE — V5261 HEARING AID, DIGIT, BIN, BTE: HCPCS | Performed by: AUDIOLOGIST

## 2023-06-28 PROCEDURE — V5160 DISPENSING FEE BINAURAL: HCPCS | Performed by: AUDIOLOGIST

## 2023-06-28 PROCEDURE — V5020 CONFORMITY EVALUATION: HCPCS | Performed by: AUDIOLOGIST

## 2023-06-28 PROCEDURE — V5011 HEARING AID FITTING/CHECKING: HCPCS | Performed by: AUDIOLOGIST

## 2023-06-28 NOTE — PROGRESS NOTES
AUDIOLOGY REPORT    SUBJECTIVE: Rocael Borden is a 48 year old male who was seen in the Audiology Clinic at the [facility] on 6/28/23 for a fitting of binaural Phonak Audeo L90-R  in the canal (NICHELLE) hearing aids. Previous results have revealed normal steeply sloping to primarily moderately-severe sensorineural hearing loss bilaterally. The patient was given medical clearance to pursue amplification by Jennyfer Ellis DNP.     OBJECTIVE: The hearing aid conformity evaluation was completed.The hearing aids were placed and they provided a good fit. Real-ear-probe-microphone measurements were completed on the WITOI system and were a good match to NAL-NL1 target with soft sounds audible, moderate sounds comfortable, and loud sounds below discomfort. UCLs are verified through maximum power output measures and demonstrate appropriate limiting of loud inputs. Rocael was oriented to proper hearing aid use, care, cleaning (no water, dry brush), batteries (size: BATTERY SIZE: rechargeable, insertion/removal, toxicity, low-battery signal), aid insertion/removal, user booklet, warranty information, storage cases, and other hearing aid details. The patient confirmed understanding of hearing aid use and care, and showed proper insertion of hearing aid and batteries while in the office today. Rocael reported good volume and sound quality today.     Hearing aids were programmed as follows:  Program 1: Automatic    Overall gain is set to 100%. SoundRecover is active. Feedback manager was completed.  The patient wants time to adjust to his hearing aids and does not want them connected to his smart phone today.      EAR(S) FIT: Bilateral  HEARING AID MODEL NAME: Phonak Audeo L90-R  HEARING AID STYLE: -in-the-ear behind-the-ear  EARMOLDS/TIP: RITE Size 1  SERIAL NUMBERS: Right: [id number] left: [id number]  WARRANTY END DATE:  8/28/2026  ACCESSORY: Partner [facility] (S/N: [id number])      ASSESSMENT: Binaural Phonak Audeo L90-R hearing aid(s) were fit today. Verification measures were performed. Rocael signed the Hearing Aid Purchase Agreement and was given a copy, as well as details on his hearing aids. Patient was counseled that exact out of pocket amounts cannot be determined for hearing aid claims being sent to insurance. Any insurance coverage information presented to the patient is an estimate only, and is not a guarantee of payment. Patient has been advised to check with their own insurance.    PLAN: Rocael will return for follow-up in 3-4 weeks for a hearing aid review appointment at which time cleaning, bluetooth connectivity and the partner microphone will be discussed. During their trial period they are encouraged to keep a list of listening situations that can be improved. Those concerns will be addressed at the next appointment. Please call this clinic with questions regarding today s appointment.    Selma Villalta, CCC-A  Clinical Audiologist  [place] #[id number]

## 2023-08-09 ENCOUNTER — OFFICE VISIT (OUTPATIENT)
Dept: AUDIOLOGY | Facility: CLINIC | Age: 48
End: 2023-08-09
Payer: COMMERCIAL

## 2023-08-09 DIAGNOSIS — H90.3 SENSORINEURAL HEARING LOSS, ASYMMETRICAL: Primary | ICD-10-CM

## 2023-08-09 PROCEDURE — 99207 PR ASSESSMENT FOR HEARING AID: CPT | Performed by: AUDIOLOGIST

## 2023-08-09 NOTE — PROGRESS NOTES
AUDIOLOGY REPORT    SUBJECTIVE:Rocael Borden is a 48 year old male who was seen in the Audiology Clinic at the [facility] on 8/09/23 for a follow-up check regarding the fitting of new hearing aids. Previous results have revealed normal steeply sloping to primarily moderately-severe sensorineural hearing loss bilaterally. The patient was given medical clearance to pursue amplification by Jennyfer Ellis DNP. Rocael reports that he wore the hearing aids for 2 days because sharp sounds (a wire in the back of the car, dishes, and birds) were too loud. These sounds were too bothersome and distracting which prevented him from wearing the hearing aids. He reports the fit of the hearing aids is comfortable.      OBJECTIVE: Based on patient report, the following changes were made:     1) Hearing aids were charged in the office. The hearing aids were connected to the software and SoftNoise reduction was increased 6 steps in the calm and speech in noise program. Sharp sounds were replicated in the office and Rocael reported improved comfort. He stated his own voice was too loud; therefore, soft-level sounds from 250-1000 Hz were decreased. The patient reports his own voice is comfortable. Rocael does not want to connect his hearing aids to his phone. He reports satisfaction with today's adjustments.    These hearing aids were billed through the patient's previous insurance which changed on 7/1/23. He states that he has yet to receive a bill for the hearing aids and he is reassured that the hearing aids were billed to his previous insurance. If he decides to pursue a different  or type of hearing aid, he should not be re-billed for the devices as long as he continues with the same level of technology. The patient verbalized understanding.      ASSESSMENT: A follow-up appointment for hearing aid fitting was completed today. No charge for today's services as hearing aids are in  warranty.       PLAN: It is recommended that Rocael return on 8/23/23 to follow-up on his experience with the hearing aids. He will decide if he would like to keep these devices, trial a different pair of hearing aids, or discontinue the hearing aid trial. Please call our clinic with questions or concerns.    Selma Villalta, CCC-A  Clinical Audiologist   [place] #[id number]\

## 2023-08-23 ENCOUNTER — OFFICE VISIT (OUTPATIENT)
Dept: AUDIOLOGY | Facility: CLINIC | Age: 48
End: 2023-08-23
Payer: COMMERCIAL

## 2023-08-23 DIAGNOSIS — H90.3 SENSORINEURAL HEARING LOSS, ASYMMETRICAL: Primary | ICD-10-CM

## 2023-08-23 PROCEDURE — 99207 PR ASSESSMENT FOR HEARING AID: CPT | Performed by: AUDIOLOGIST

## 2023-08-23 NOTE — PROGRESS NOTES
"AUDIOLOGY REPORT    SUBJECTIVE:Rocael Borden is a 48 year old male who was seen in the Audiology Clinic at the [facility] on 8/23/23 for a follow-up check regarding the fitting of new hearing aids. Previous results have revealed normal steeply sloping to primarily moderately-severe sensorineural hearing loss bilaterally. The patient was given medical clearance to pursue amplification by Jennyfer Ellis DNP.     Today, Rocael reports difficulty focusing on the TV when his wife is behind him in the kitchen making noise. The hearing aids  too much of the background noise and it is too distracting.     OBJECTIVE: Based on patient report, the following changes were made:     1) A \"Focus\" program is created in which the dynamic noise cancelling is strengthened to moderate. NoiseBlock is also strengthened. The manual buttons are active to change the volume and program.  He will double tap his ear to answer/and phone calls.  This was practiced in the office.    2) Discussed cleaning and how to clean wax traps.       Rocael reports satisfaction with today's adjustments. Rocael would like to keep these devices.       ASSESSMENT: A follow-up appointment for hearing aid fitting was completed today. No charge for today's services as hearing aids are in warranty.     PLAN: It is recommended that Rocael return for follow-up as needed, or at least every 9-12 months for cleaning and assessment of hearing aid.  Please call our clinic with questions or concerns.    Selma Villalta, CCC-A  Clinical Audiologist   [place] #[id number]    "

## 2024-01-21 ENCOUNTER — HEALTH MAINTENANCE LETTER (OUTPATIENT)
Age: 49
End: 2024-01-21

## 2024-05-20 SDOH — HEALTH STABILITY: PHYSICAL HEALTH: ON AVERAGE, HOW MANY DAYS PER WEEK DO YOU ENGAGE IN MODERATE TO STRENUOUS EXERCISE (LIKE A BRISK WALK)?: 3 DAYS

## 2024-05-20 SDOH — HEALTH STABILITY: PHYSICAL HEALTH: ON AVERAGE, HOW MANY MINUTES DO YOU ENGAGE IN EXERCISE AT THIS LEVEL?: 30 MIN

## 2024-05-20 ASSESSMENT — SOCIAL DETERMINANTS OF HEALTH (SDOH): HOW OFTEN DO YOU GET TOGETHER WITH FRIENDS OR RELATIVES?: ONCE A WEEK

## 2024-05-22 ENCOUNTER — OFFICE VISIT (OUTPATIENT)
Dept: PEDIATRICS | Facility: CLINIC | Age: 49
End: 2024-05-22
Payer: COMMERCIAL

## 2024-05-22 VITALS
RESPIRATION RATE: 14 BRPM | TEMPERATURE: 98.4 F | WEIGHT: 246.9 LBS | HEIGHT: 73 IN | HEART RATE: 75 BPM | BODY MASS INDEX: 32.72 KG/M2 | DIASTOLIC BLOOD PRESSURE: 94 MMHG | SYSTOLIC BLOOD PRESSURE: 142 MMHG | OXYGEN SATURATION: 96 %

## 2024-05-22 DIAGNOSIS — I10 BENIGN ESSENTIAL HYPERTENSION: ICD-10-CM

## 2024-05-22 DIAGNOSIS — E78.5 HYPERLIPIDEMIA LDL GOAL <160: ICD-10-CM

## 2024-05-22 DIAGNOSIS — Z87.442 HISTORY OF KIDNEY STONES: ICD-10-CM

## 2024-05-22 DIAGNOSIS — Z12.5 SCREENING FOR PROSTATE CANCER: ICD-10-CM

## 2024-05-22 DIAGNOSIS — R79.89 ELEVATED LFTS: ICD-10-CM

## 2024-05-22 DIAGNOSIS — Z23 NEED FOR VACCINATION: ICD-10-CM

## 2024-05-22 DIAGNOSIS — Z13.1 SCREENING FOR DIABETES MELLITUS: ICD-10-CM

## 2024-05-22 DIAGNOSIS — K76.0 FATTY LIVER: ICD-10-CM

## 2024-05-22 DIAGNOSIS — Z00.00 ROUTINE GENERAL MEDICAL EXAMINATION AT A HEALTH CARE FACILITY: Primary | ICD-10-CM

## 2024-05-22 LAB — HBA1C MFR BLD: 5.3 % (ref 0–5.6)

## 2024-05-22 PROCEDURE — 99396 PREV VISIT EST AGE 40-64: CPT | Mod: 25 | Performed by: NURSE PRACTITIONER

## 2024-05-22 PROCEDURE — 90471 IMMUNIZATION ADMIN: CPT | Performed by: NURSE PRACTITIONER

## 2024-05-22 PROCEDURE — 36415 COLL VENOUS BLD VENIPUNCTURE: CPT | Performed by: NURSE PRACTITIONER

## 2024-05-22 PROCEDURE — 90750 HZV VACC RECOMBINANT IM: CPT | Performed by: NURSE PRACTITIONER

## 2024-05-22 PROCEDURE — 80061 LIPID PANEL: CPT | Performed by: NURSE PRACTITIONER

## 2024-05-22 PROCEDURE — 80053 COMPREHEN METABOLIC PANEL: CPT | Performed by: NURSE PRACTITIONER

## 2024-05-22 PROCEDURE — 99214 OFFICE O/P EST MOD 30 MIN: CPT | Mod: 25 | Performed by: NURSE PRACTITIONER

## 2024-05-22 PROCEDURE — 90472 IMMUNIZATION ADMIN EACH ADD: CPT | Performed by: NURSE PRACTITIONER

## 2024-05-22 PROCEDURE — 83036 HEMOGLOBIN GLYCOSYLATED A1C: CPT | Performed by: NURSE PRACTITIONER

## 2024-05-22 PROCEDURE — G0103 PSA SCREENING: HCPCS | Performed by: NURSE PRACTITIONER

## 2024-05-22 PROCEDURE — 90677 PCV20 VACCINE IM: CPT | Performed by: NURSE PRACTITIONER

## 2024-05-22 RX ORDER — HYDROCHLOROTHIAZIDE 12.5 MG/1
12.5 TABLET ORAL DAILY
Qty: 90 TABLET | Refills: 0 | Status: SHIPPED | OUTPATIENT
Start: 2024-05-22

## 2024-05-22 RX ORDER — ATORVASTATIN CALCIUM 10 MG/1
10 TABLET, FILM COATED ORAL DAILY
Qty: 90 TABLET | Refills: 1 | Status: SHIPPED | OUTPATIENT
Start: 2024-05-22 | End: 2024-05-28

## 2024-05-22 ASSESSMENT — PAIN SCALES - GENERAL: PAINLEVEL: NO PAIN (0)

## 2024-05-22 NOTE — PATIENT INSTRUCTIONS
"Preventive Care Advice   This is general advice we often give to help people stay healthy. Your care team may have specific advice just for you. Please talk to your care team about your own preventive care needs.  Lifestyle  Exercise at least 150 minutes each week (30 minutes a day, 5 days a week).  Do muscle strengthening activities 2 days a week. These help control your weight and prevent disease.  No smoking.  Wear sunscreen to prevent skin cancer.  Have your home tested for radon every 2 to 5 years. Radon is a colorless, odorless gas that can harm your lungs. To learn more, go to www.health.[facility].mn. and search for \"Radon in Homes.\"  Keep guns unloaded and locked up in a safe place like a safe or gun vault, or, use a gun lock and hide the keys. Always lock away bullets separately. To learn more, visit [employer].[place].gov and search for \"safe gun storage.\"  Nutrition  Eat 5 or more servings of fruits and vegetables each day.  Try wheat bread, brown rice and whole grain pasta (instead of white bread, rice, and pasta).  Get enough calcium and vitamin D. Check the label on foods and aim for 100% of the RDA (recommended daily allowance).  Regular exams  Have a dental exam and cleaning every 6 months.  See your health care team every year to talk about:  Any changes in your health.  Any medicines your care team has prescribed.  Preventive care, family planning, and ways to prevent chronic diseases.  Shots (vaccines)   HPV shots (up to age 26), if you've never had them before.  Hepatitis B shots (up to age 59), if you've never had them before.  COVID-19 shot: Get this shot when it's due.  Flu shot: Get a flu shot every year.  Tetanus shot: Get a tetanus shot every 10 years.  Pneumococcal, hepatitis A, and RSV shots: Ask your care team if you need these based on your risk.  Shingles shot (for age 50 and up).  General health tests  Diabetes screening:  Starting at age 35, Get screened for diabetes at least every 3 years.  If " you are younger than age 35, ask your care team if you should be screened for diabetes.  Cholesterol test: At age 39, start having a cholesterol test every 5 years, or more often if advised.  Bone density scan (DEXA): At age 50, ask your care team if you should have this scan for osteoporosis (brittle bones).  Hepatitis C: Get tested at least once in your life.  Abdominal aortic aneurysm screening: Talk to your doctor about having this screening if you:  Have ever smoked; and  Are biologically male; and  Are between the ages of 65 and 75.  STIs (sexually transmitted infections)  Before age 24: Ask your care team if you should be screened for STIs.  After age 24: Get screened for STIs if you're at risk. You are at risk for STIs (including HIV) if:  You are sexually active with more than one person.  You don't use condoms every time.  You or a partner was diagnosed with a sexually transmitted infection.  If you are at risk for HIV, ask about PrEP medicine to prevent HIV.  Get tested for HIV at least once in your life, whether you are at risk for HIV or not.  Cancer screening tests  Cervical cancer screening: If you have a cervix, begin getting regular cervical cancer screening tests at age 21. Most people who have regular screenings with normal results can stop after age 65. Talk about this with your provider.  Breast cancer scan (mammogram): If you've ever had breasts, begin having regular mammograms starting at age 40. This is a scan to check for breast cancer.  Colon cancer screening: It is important to start screening for colon cancer at age 45.  Have a colonoscopy test every 10 years (or more often if you're at risk) Or, ask your provider about stool tests like a FIT test every year or Cologuard test every 3 years.  To learn more about your testing options, visit: www.[employer]/378187.pdf.  For help making a decision, visit: joann/ub27964.  Prostate cancer screening test: If you have a prostate and are age 55  to 69, ask your provider if you would benefit from a yearly prostate cancer screening test.  Lung cancer screening: If you are a current or former smoker age 50 to 80, ask your care team if ongoing lung cancer screenings are right for you.  For informational purposes only. Not to replace the advice of your health care provider. Copyright   2023 [employer]. All rights reserved. Clinically reviewed by the [facility]itions Program. [employer] 233727 - REV 04/24.    Learning About Stress  What is stress?     Stress is your body's response to a hard situation. Your body can have a physical, emotional, or mental response. Stress is a fact of life for most people, and it affects everyone differently. What causes stress for you may not be stressful for someone else.  A lot of things can cause stress. You may feel stress when you go on a job interview, take a test, or run a race. This kind of short-term stress is normal and even useful. It can help you if you need to work hard or react quickly. For example, stress can help you finish an important job on time.  Long-term stress is caused by ongoing stressful situations or events. Examples of long-term stress include long-term health problems, ongoing problems at work, or conflicts in your family. Long-term stress can harm your health.  How does stress affect your health?  When you are stressed, your body responds as though you are in danger. It makes hormones that speed up your heart, make you breathe faster, and give you a burst of energy. This is called the fight-or-flight stress response. If the stress is over quickly, your body goes back to normal and no harm is done.  But if stress happens too often or lasts too long, it can have bad effects. Long-term stress can make you more likely to get sick, and it can make symptoms of some diseases worse. If you tense up when you are stressed, you may develop neck, shoulder, or low back pain. Stress is  linked to high blood pressure and heart disease.  Stress also harms your emotional health. It can make you garcia, tense, or depressed. Your relationships may suffer, and you may not do well at work or school.  What can you do to manage stress?  You can try these things to help manage stress:   Do something active. Exercise or activity can help reduce stress. Walking is a great way to get started. Even everyday activities such as housecleaning or yard work can help.  Try yoga or roselyn chi. These techniques combine exercise and meditation. You may need some training at first to learn them.  Do something you enjoy. For example, listen to music or go to a movie. Practice your hobby or do volunteer work.  Meditate. This can help you relax, because you are not worrying about what happened before or what may happen in the future.  Do guided imagery. Imagine yourself in any setting that helps you feel calm. You can use online videos, books, or a teacher to guide you.  Do breathing exercises. For example:  From a standing position, bend forward from the waist with your knees slightly bent. Let your arms dangle close to the floor.  Breathe in slowly and deeply as you return to a standing position. Roll up slowly and lift your head last.  Hold your breath for just a few seconds in the standing position.  Breathe out slowly and bend forward from the waist.  Let your feelings out. Talk, laugh, cry, and express anger when you need to. Talking with supportive friends or family, a counselor, or a rafat leader about your feelings is a healthy way to relieve stress. Avoid discussing your feelings with people who make you feel worse.  Write. It may help to write about things that are bothering you. This helps you find out how much stress you feel and what is causing it. When you know this, you can find better ways to cope.  What can you do to prevent stress?  You might try some of these things to help prevent stress:  Manage your time.  "This helps you find time to do the things you want and need to do.  Get enough sleep. Your body recovers from the stresses of the day while you are sleeping.  Get support. Your family, friends, and community can make a difference in how you experience stress.  Limit your news feed. Avoid or limit time on social media or news that may make you feel stressed.  Do something active. Exercise or activity can help reduce stress. Walking is a great way to get started.  Where can you learn more?  Go to https://www.[employer].net/patiented  Enter N032 in the search box to learn more about \"Learning About Stress.\"  Current as of: October 24, 2023               Content Version: 14.0    1589-0936 [employer].   Care instructions adapted under license by your healthcare professional. If you have questions about a medical condition or this instruction, always ask your healthcare professional. [employer] disclaims any warranty or liability for your use of this information.      Learning About Alcohol Use Disorder  What is alcohol use disorder?  Alcohol use disorder means that a person drinks alcohol even though it causes harm to themselves or others. It can range from mild to severe. The more symptoms of this disorder you have, the more severe it may be. People who have it may find it hard to control their use of alcohol.  People who have this disorder may argue with others about how much they're drinking. Their job may be affected because of drinking. They may drink when it's dangerous or illegal, such as when they drive. They also may have a strong need, or craving, to drink. They may feel like they must drink just to get by. Their drinking may increase their risk of getting hurt or being in a car crash.  Over time, drinking too much alcohol may cause health problems. These may include high blood pressure, liver problems, or problems with digestion.  What are the symptoms?  Maybe you've wondered about " your alcohol habits or how to tell if your drinking is becoming a problem.  Here are some of the symptoms of alcohol use disorder. You may have it if you have two or more of the following symptoms:  You drink larger amounts of alcohol than you ever meant to. Or you've been drinking for a longer time than you ever meant to.  You can't cut down or control your use. Or you constantly wish you could cut down.  You spend a lot of time getting or drinking alcohol or recovering from its effects.  You have strong cravings for alcohol.  You can no longer do your main jobs at work, at school, or at home.  You keep drinking alcohol, even though your use hurts your relationships.  You have stopped doing important activities because of your alcohol use.  You drink alcohol in situations where doing so is dangerous.  You keep drinking alcohol even though you know it's causing health problems.  You need more and more alcohol to get the same effect, or you get less effect from the same amount over time. This is called tolerance.  You have uncomfortable symptoms when you stop drinking alcohol or use less. This is called withdrawal.  Alcohol use disorder can range from mild to severe. The more symptoms you have, the more severe the disorder may be.  You might not realize that your drinking is a problem. You might not drink large amounts when you drink. Or you might go for days or weeks between drinking episodes. But even if you don't drink very often, your drinking could still be harmful and put you at risk.  How is alcohol use disorder treated?  Getting help is up to you. But you don't have to do it alone. There are many people and kinds of treatments that can help.  Treatment for alcohol use disorder can include:  Group therapy, one or more types of counseling, and alcohol education.  Medicines that help to:  Reduce withdrawal symptoms and help you safely stop drinking.  Reduce cravings for alcohol.  Support groups. These groups  "include Alcoholics Anonymous and SMART Recovery (Self-Management and Recovery Training).  Some people are able to stop or cut back on drinking with help from a counselor. People who have moderate to severe alcohol use disorder may need medical treatment. They may need to stay in a hospital or treatment center.  You may have a treatment team to help you. This team may include a psychologist or psychiatrist, counselors, doctors, social workers, nurses, and a . A  helps plan and manage your treatment.  Follow-up care is a key part of your treatment and safety. Be sure to make and go to all appointments, and call your doctor if you are having problems. It's also a good idea to know your test results and keep a list of the medicines you take.  Where can you learn more?  Go to https://www.[employer].net/patiented  Enter H758 in the search box to learn more about \"Learning About Alcohol Use Disorder.\"  Current as of: November 15, 2023               Content Version: 14.0    1733-5920 [employer].   Care instructions adapted under license by your healthcare professional. If you have questions about a medical condition or this instruction, always ask your healthcare professional. Healthwise, [employer] disclaims any warranty or liability for your use of this information.      "

## 2024-05-22 NOTE — PROGRESS NOTES
Preventive Care Visit  [facility] CHEPE Farris NP, Family Medicine  May 22, 2024      Assessment & Plan     Routine general medical examination at a health care facility  Routine exam performed today. Age appropriate screening and preventative care have been addressed today.   Vaccinations have been updated per patient request. Recommend annual vision exams as well as biannual dental exams. They will follow up for annual physical again in one year.      Hyperlipidemia LDL goal <160  Elevated.  Labs updated.  ASCVD risk score elevated.  Started on atorvastatin today.  Message sent to [facility] re: LFTs and atorvastatin  Repeat labs in 3 months.  The 10-year ASCVD risk score (Jacobo RON, et al., 2019) is: 8.6%    Values used to calculate the score:      Age: 49 years      Sex: Male      Is Non- : No      Diabetic: No      Tobacco smoker: Yes      Systolic Blood Pressure: 142 mmHg      Is BP treated: Yes      HDL Cholesterol: 67 mg/dL      Total Cholesterol: 213 mg/dL    - Lipid panel reflex to direct LDL Non-fasting  - atorvastatin (LIPITOR) 10 MG tablet; Take 1 tablet (10 mg) by mouth daily  - Comprehensive metabolic panel (BMP + Alb, Alk Phos, ALT, AST, Total. Bili, TP)    Benign essential hypertension  Elevated BP readings.  Started on hydrochlorothiazide today.    Follow-up in 2 weeks for nurse only vitals.  - hydroCHLOROthiazide 12.5 MG tablet; Take 1 tablet (12.5 mg) by mouth daily  - Comprehensive metabolic panel (BMP + Alb, Alk Phos, ALT, AST, Total. Bili, TP)    Fatty liver  Elevated LFTs  Followed by [facility].   Upcoming colonoscopy this summer.    History of kidney stones--uses tamsulosin as needed  Stable.  No recurrence this year.    Screening for diabetes mellitus  - Hemoglobin A1c    Screening for prostate cancer  - PSA, screen    Need for vaccination  - Pneumococcal 20 Valent Conjugate (Prevnar 20)  - ZOSTER RECOMBINANT ADJUVANTED (SHINGRIX)      Nicotine/Tobacco  "Cessation  He reports that he has been smoking cigarettes. He has quit using smokeless tobacco.  His smokeless tobacco use included chew.  Nicotine/Tobacco Cessation Plan  Information offered: Patient not interested at this time  Social smoker.      BMI  Estimated body mass index is 32.37 kg/m  as calculated from the following:    Height as of this encounter: 1.86 m (6' 1.23\").    Weight as of this encounter: 112 kg (246 lb 14.4 oz).   Weight management plan: Discussed healthy diet and exercise guidelines    Counseling  Appropriate preventive services were discussed with this patient, including applicable screening as appropriate for fall prevention, nutrition, physical activity, Tobacco-use cessation, weight loss and cognition.  Checklist reviewing preventive services available has been given to the patient.  Reviewed patient's diet, addressing concerns and/or questions.   He is at risk for lack of exercise and has been provided with information to increase physical activity for the benefit of his well-being.   He is at risk for psychosocial distress and has been provided with information to reduce risk.   The patient reports drinking more than one alcoholic drink per day and sometimes engages in binge or excessive drinking. The patient was counseled and given information about possible harmful effects of excessive alcohol intake as well as where to get help for alcohol problems.       Javon Cote is a 49 year old, presenting for the following:    Physical        5/22/2024     2:42 PM   Additional Questions   Roomed by Karli SUGGS   Accompanied by Self         5/22/2024     2:42 PM   Patient Reported Additional Medications   Patient reports taking the following new medications No        Health Care Directive  Patient does not have a Health Care Directive or Living Will: Patient states has Advance Directive and will bring in a copy to clinic.    HPI        5/20/2024   General Health   How would you rate your " overall physical health? (!) FAIR   Feel stress (tense, anxious, or unable to sleep) Only a little   (!) STRESS CONCERN      5/20/2024   Nutrition   Three or more servings of calcium each day? Yes   Diet: Regular (no restrictions)   How many servings of fruit and vegetables per day? (!) 2-3   How many sweetened beverages each day? 0-1         5/20/2024   Exercise   Days per week of moderate/strenous exercise 3 days   Average minutes spent exercising at this level 30 min     Wt Readings from Last 4 Encounters:   05/22/24 112 kg (246 lb 14.4 oz)   06/01/23 110.2 kg (243 lb)   05/31/23 111.1 kg (245 lb)   12/15/22 108.7 kg (239 lb 11.2 oz)      Treadmill  Walking        5/20/2024   Social Factors   Frequency of gathering with friends or relatives Once a week   Worry food won't last until get money to buy more No   Food not last or not have enough money for food? No   Do you have housing?  Yes   Are you worried about losing your housing? No   Lack of transportation? No   Unable to get utilities (heat,electricity)? No         5/20/2024   Fall Risk   Fallen 2 or more times in the past year? No   Trouble with walking or balance? No          5/20/2024   Dental   Dentist two times every year? Yes         5/20/2024   TB Screening   Were you born outside of the [place]? No       Today's PHQ-2 Score:       5/22/2024     2:39 PM   PHQ-2 ( 1999 Pfizer)   Q1: Little interest or pleasure in doing things 0   Q2: Feeling down, depressed or hopeless 0   PHQ-2 Score 0   Q1: Little interest or pleasure in doing things Not at all   Q2: Feeling down, depressed or hopeless Not at all   PHQ-2 Score 0           5/20/2024   Substance Use   Alcohol more than 3/day or more than 7/wk Yes   How often do you have a drink containing alcohol 2 to 3 times a week   How many alcohol drinks on typical day 3 or 4   How often do you have 5+ drinks at one occasion Less than monthly   Audit 2/3 Score 2   How often not able to stop drinking once started Never    How often failed to do what normally expected Never   How often needed first drink in am after a heavy drinking session Never   How often feeling of guilt or remorse after drinking Never   How often unable to remember what happened the night before Never   Have you or someone else been injured because of your drinking No   Has anyone been concerned or suggested you cut down on drinking No   TOTAL SCORE - AUDIT 5   Do you use any other substances recreationally? No     Social History     Tobacco Use    Smoking status: Some Days     Types: Cigarettes    Smokeless tobacco: Former     Types: Chew   Vaping Use    Vaping status: Never Used   Substance Use Topics    Alcohol use: Yes    Drug use: No         2024   STI Screening   New sexual partner(s) since last STI/HIV test? No     ASCVD Risk   The 10-year ASCVD risk score (Jacobo RON, et al., 2019) is: 8.6%    Values used to calculate the score:      Age: 49 years      Sex: Male      Is Non- : No      Diabetic: No      Tobacco smoker: Yes      Systolic Blood Pressure: 142 mmHg      Is BP treated: No      HDL Cholesterol: 71 mg/dL      Total Cholesterol: 250 mg/dL    Fracture Risk Assessment Tool  Link to Frax Calculator  Use the information below to complete the Frax calculator  : 1975  Sex: male  Weight (kg): 112 kg (actual weight)  Height (cm): 186 cm  Previous Fragility Fracture:  No  History of parent with fractured hip:  No  Current Smoking:  Yes  Patient has been on glucocorticoids for more than 3 months (5mg/day or more): No  Rheumatoid Arthritis on Problem List:  No  Secondary Osteoporosis on Problem List:  No  Consumes 3 or more units of alcohol per day: No  Femoral Neck BMD (g/cm2)        Elevated BP:  -Has been elevated last few visits.  BP Readings from Last 3 Encounters:   24 (!) 142/94   23 (!) 159/108   23 (!) 147/97     Wt Readings from Last 4 Encounters:   24 112 kg (246 lb 14.4 oz)  "  06/01/23 110.2 kg (243 lb)   05/31/23 111.1 kg (245 lb)   12/15/22 108.7 kg (239 lb 11.2 oz)         Kidney stones:  -Frequent history.  Last stone 2023.  -Does have Flomax to take when he feels like he has one.  -Does not follow calcium oxalate diet.     Elevated LFT:  -Persistent elevated LFT  -Hepatitis screening negative  -US showed fatty liver  -Did follow-up with [facility] and recommended Fibro-Scan but patient declined.  -Has lost weight and changed diet.  -Patient has upcoming appointment with [facility] for colonoscopy and consult.            5/20/2024   Contraception/Family Planning   Questions about contraception or family planning No       Reviewed and updated as needed this visit by Provider                      Review of Systems  Constitutional, HEENT, cardiovascular, pulmonary, gi and gu systems are negative, except as otherwise noted.     Objective    Exam  BP (!) 142/94 (BP Location: Right arm, Patient Position: Sitting, Cuff Size: Adult Large)   Pulse 75   Temp 98.4  F (36.9  C) (Tympanic)   Resp 14   Ht 1.86 m (6' 1.23\")   Wt 112 kg (246 lb 14.4 oz)   SpO2 96%   BMI 32.37 kg/m     Estimated body mass index is 32.37 kg/m  as calculated from the following:    Height as of this encounter: 1.86 m (6' 1.23\").    Weight as of this encounter: 112 kg (246 lb 14.4 oz).    Physical Exam  GENERAL: alert and no distress  EYES: Eyes grossly normal to inspection, PERRL and conjunctivae and sclerae normal  HENT: ear canals and TM's normal, nose and mouth without ulcers or lesions  NECK: no adenopathy, no asymmetry, masses, or scars  RESP: lungs clear to auscultation - no rales, rhonchi or wheezes  CV: regular rate and rhythm, normal S1 S2, no S3 or S4, no murmur, click or rub, no peripheral edema  ABDOMEN: soft, nontender, no hepatosplenomegaly, no masses and bowel sounds normal  MS: no gross musculoskeletal defects noted, no edema  SKIN: no suspicious lesions or rashes  NEURO: Normal strength and tone, mentation " intact and speech normal  PSYCH: mentation appears normal, affect normal/bright        Signed Electronically by: Nhung Farris NP

## 2024-05-23 LAB
ALBUMIN SERPL BCG-MCNC: 4.6 G/DL (ref 3.5–5.2)
ALP SERPL-CCNC: 90 U/L (ref 40–150)
ALT SERPL W P-5'-P-CCNC: 246 U/L (ref 0–70)
ANION GAP SERPL CALCULATED.3IONS-SCNC: 12 MMOL/L (ref 7–15)
AST SERPL W P-5'-P-CCNC: 170 U/L (ref 0–45)
BILIRUB SERPL-MCNC: 1.1 MG/DL
BUN SERPL-MCNC: 9.3 MG/DL (ref 6–20)
CALCIUM SERPL-MCNC: 10.3 MG/DL (ref 8.6–10)
CHLORIDE SERPL-SCNC: 105 MMOL/L (ref 98–107)
CHOLEST SERPL-MCNC: 213 MG/DL
CREAT SERPL-MCNC: 0.71 MG/DL (ref 0.67–1.17)
DEPRECATED HCO3 PLAS-SCNC: 26 MMOL/L (ref 22–29)
EGFRCR SERPLBLD CKD-EPI 2021: >90 ML/MIN/1.73M2
FASTING STATUS PATIENT QL REPORTED: YES
FASTING STATUS PATIENT QL REPORTED: YES
GLUCOSE SERPL-MCNC: 94 MG/DL (ref 70–99)
HDLC SERPL-MCNC: 67 MG/DL
LDLC SERPL CALC-MCNC: 124 MG/DL
NONHDLC SERPL-MCNC: 146 MG/DL
POTASSIUM SERPL-SCNC: 4.3 MMOL/L (ref 3.4–5.3)
PROT SERPL-MCNC: 7.5 G/DL (ref 6.4–8.3)
PSA SERPL DL<=0.01 NG/ML-MCNC: 1.83 NG/ML (ref 0–3.5)
SODIUM SERPL-SCNC: 143 MMOL/L (ref 135–145)
TRIGL SERPL-MCNC: 111 MG/DL

## 2024-05-24 DIAGNOSIS — E78.5 HYPERLIPIDEMIA LDL GOAL <160: Primary | ICD-10-CM

## 2024-05-28 RX ORDER — EZETIMIBE 10 MG/1
10 TABLET ORAL DAILY
Qty: 90 TABLET | Refills: 1 | Status: SHIPPED | OUTPATIENT
Start: 2024-05-28

## 2024-05-30 ENCOUNTER — TRANSFERRED RECORDS (OUTPATIENT)
Dept: HEALTH INFORMATION MANAGEMENT | Facility: CLINIC | Age: 49
End: 2024-05-30
Payer: COMMERCIAL

## 2024-06-03 ENCOUNTER — TELEPHONE (OUTPATIENT)
Dept: FAMILY MEDICINE | Facility: CLINIC | Age: 49
End: 2024-06-03
Payer: COMMERCIAL

## 2024-06-04 NOTE — TELEPHONE ENCOUNTER
"\"Nhung Farris, NP   to [facility] 6/4/24  9:45 AM  Yes, start Ezetimibe and discontinue statin as of now.  He should still repeat labs in 3 months.  Thanks!  Nhung\"    Called patient and relayed provider note above. Pt verbalized understanding and agrees with plan. Pt declines scheduling lab appt at this time, stating he will schedule online via [employer]. Advised pt to call back with further questions or concerns.     Alcides Bonilla RN on 6/4/2024 at 10:02 AM    "
Called pt at [phone], not available, so LM to review his  message and/or call us back at [phone] with questions.    Also, no lab-only appointment in place in 3 months, need to help schedule that FASTING lab-only appointment when he calls back.    Manuela CALVO  Clinic RN  [facility]                    
Left message for patient to call back. Patient reviewed lab note yesterday (6/03/24) Sent follow-up uiuhart message to patient to inquire on answers to providers questions.  Eva CORREA RN, BSN    
Nhung Farris NP [facility]  Please call patient re: Preet result note he did not read--      Dear Rocael,     I hope you had a great holiday weekend!       After reviewing your chart, did you end up following up with [facility] for your Fibroscan of your liver to determine why your liver enzymes are persistently elevated?     If not, we need to switch you off of a statin all together until we determine the cause of elevated.   I have placed ezetimibe at the pharmacy for you to take which is a non statin option for reducing your cholesterol.     Once we have determined the cause of your elevated liver enzymes, we can start you back on a statin.     Please keep your lab only 3 month follow-up appointment.     Please let me know if you have any questions,     ALANNA Farris DNP  Family Medicine  
Patient returns call - states he had had colonoscopy last week through [facility]. Had Fibroscan 1-2 years ago. Was told at that time he needed to improve diet, was told it was fatty tissue. Patient picked up Ezetimibe - should he start this?    Routing back to provider.    Radha Boyd RN on 6/4/2024 at 9:31 AM         
07-Apr-2019 17:47

## 2024-07-11 ENCOUNTER — MYC MEDICAL ADVICE (OUTPATIENT)
Dept: PEDIATRICS | Facility: CLINIC | Age: 49
End: 2024-07-11
Payer: COMMERCIAL

## 2024-07-11 ENCOUNTER — E-VISIT (OUTPATIENT)
Dept: PEDIATRICS | Facility: CLINIC | Age: 49
End: 2024-07-11
Payer: COMMERCIAL

## 2024-07-11 DIAGNOSIS — Z87.442 HISTORY OF KIDNEY STONES: ICD-10-CM

## 2024-07-11 PROCEDURE — 99421 OL DIG E/M SVC 5-10 MIN: CPT | Performed by: NURSE PRACTITIONER

## 2024-07-11 RX ORDER — TAMSULOSIN HYDROCHLORIDE 0.4 MG/1
0.4 CAPSULE ORAL DAILY
Qty: 20 CAPSULE | Refills: 1 | Status: SHIPPED | OUTPATIENT
Start: 2024-07-11 | End: 2024-08-30

## 2024-07-11 NOTE — PATIENT INSTRUCTIONS
Thank you for choosing us for your care. I have placed an order for a prescription so that you can start treatment. View your full visit summary for details by clicking on the link below. Your pharmacist will able to address any questions you may have about the medication.     If you're not feeling better within 5-7 days, please schedule an appointment.  You can schedule an appointment right here in [facility], or call [phone]  If the visit is for the same symptoms as your eVisit, we'll refund the cost of your eVisit if seen within seven days.

## 2024-07-11 NOTE — TELEPHONE ENCOUNTER
See patient's MyChart message   - Patient requesting a refill of tamsulosin (FLOMAX) 0.4 MG capsule as he reports that he may be passing a stone     tamsulosin (FLOMAX) 0.4 MG capsule 20 capsule 1 9/16/2021 -- --   Sig - Route: Take 1 capsule (0.4 mg) by mouth daily At onset of flank pain for kidney stone - Oral     Sent a [employer] message to the patient   - Informed the patient to submit an eVisit for his provider to discuss and refill/order as appropriate     Daria ALDRIDGE RN   [facility]

## 2024-07-18 ENCOUNTER — TRANSFERRED RECORDS (OUTPATIENT)
Dept: HEALTH INFORMATION MANAGEMENT | Facility: CLINIC | Age: 49
End: 2024-07-18

## 2024-07-31 ENCOUNTER — TRANSFERRED RECORDS (OUTPATIENT)
Dept: HEALTH INFORMATION MANAGEMENT | Facility: CLINIC | Age: 49
End: 2024-07-31
Payer: COMMERCIAL

## 2024-08-20 ENCOUNTER — TELEPHONE (OUTPATIENT)
Dept: MEDSURG UNIT | Facility: CLINIC | Age: 49
End: 2024-08-20
Payer: COMMERCIAL

## 2024-08-22 ENCOUNTER — LAB (OUTPATIENT)
Dept: LAB | Facility: CLINIC | Age: 49
End: 2024-08-22
Payer: COMMERCIAL

## 2024-08-22 ENCOUNTER — ALLIED HEALTH/NURSE VISIT (OUTPATIENT)
Dept: PEDIATRICS | Facility: CLINIC | Age: 49
End: 2024-08-22
Payer: COMMERCIAL

## 2024-08-22 VITALS — SYSTOLIC BLOOD PRESSURE: 129 MMHG | DIASTOLIC BLOOD PRESSURE: 80 MMHG | HEART RATE: 77 BPM

## 2024-08-22 DIAGNOSIS — E78.5 HYPERLIPIDEMIA LDL GOAL <160: ICD-10-CM

## 2024-08-22 DIAGNOSIS — Z01.30 BP CHECK: Primary | ICD-10-CM

## 2024-08-22 LAB
ALBUMIN SERPL BCG-MCNC: 4.7 G/DL (ref 3.5–5.2)
ALP SERPL-CCNC: 99 U/L (ref 40–150)
ALT SERPL W P-5'-P-CCNC: 255 U/L (ref 0–70)
ANION GAP SERPL CALCULATED.3IONS-SCNC: 12 MMOL/L (ref 7–15)
AST SERPL W P-5'-P-CCNC: 240 U/L (ref 0–45)
BILIRUB DIRECT SERPL-MCNC: 0.26 MG/DL (ref 0–0.3)
BILIRUB SERPL-MCNC: 1 MG/DL
BUN SERPL-MCNC: 8.1 MG/DL (ref 6–20)
CALCIUM SERPL-MCNC: 10.9 MG/DL (ref 8.8–10.4)
CHLORIDE SERPL-SCNC: 103 MMOL/L (ref 98–107)
CHOLEST SERPL-MCNC: 200 MG/DL
CREAT SERPL-MCNC: 0.85 MG/DL (ref 0.67–1.17)
EGFRCR SERPLBLD CKD-EPI 2021: >90 ML/MIN/1.73M2
FASTING STATUS PATIENT QL REPORTED: YES
FASTING STATUS PATIENT QL REPORTED: YES
GLUCOSE SERPL-MCNC: 92 MG/DL (ref 70–99)
HCO3 SERPL-SCNC: 28 MMOL/L (ref 22–29)
HDLC SERPL-MCNC: 71 MG/DL
LDLC SERPL CALC-MCNC: 95 MG/DL
NONHDLC SERPL-MCNC: 129 MG/DL
POTASSIUM SERPL-SCNC: 4.4 MMOL/L (ref 3.4–5.3)
PROT SERPL-MCNC: 7.8 G/DL (ref 6.4–8.3)
SODIUM SERPL-SCNC: 143 MMOL/L (ref 135–145)
TRIGL SERPL-MCNC: 168 MG/DL

## 2024-08-22 PROCEDURE — 80061 LIPID PANEL: CPT

## 2024-08-22 PROCEDURE — 80053 COMPREHEN METABOLIC PANEL: CPT

## 2024-08-22 PROCEDURE — 82248 BILIRUBIN DIRECT: CPT

## 2024-08-22 PROCEDURE — 36415 COLL VENOUS BLD VENIPUNCTURE: CPT

## 2024-08-22 PROCEDURE — 99207 PR NO CHARGE NURSE ONLY: CPT

## 2024-08-22 NOTE — PROGRESS NOTES
Patient came in today for BP check- he started bp and cholesterol meds back in May     Rocael REVA Borden is a 49 year old patient who comes in today for a Blood Pressure check.  Initial BP:  /80 (BP Location: Right arm, Patient Position: Sitting, Cuff Size: Adult Large)   Pulse 77      77  Disposition: results routed to provider

## 2024-08-23 ENCOUNTER — E-VISIT (OUTPATIENT)
Dept: PEDIATRICS | Facility: CLINIC | Age: 49
End: 2024-08-23
Payer: COMMERCIAL

## 2024-08-23 DIAGNOSIS — E83.52 SERUM CALCIUM ELEVATED: Primary | ICD-10-CM

## 2024-08-23 DIAGNOSIS — R10.9 FLANK PAIN: ICD-10-CM

## 2024-08-23 DIAGNOSIS — Z87.442 HISTORY OF KIDNEY STONES: Primary | ICD-10-CM

## 2024-08-23 PROCEDURE — 99207 PR NO BILLABLE SERVICE THIS VISIT: CPT | Performed by: NURSE PRACTITIONER

## 2024-08-26 ENCOUNTER — HOSPITAL ENCOUNTER (OUTPATIENT)
Dept: GENERAL RADIOLOGY | Facility: CLINIC | Age: 49
Discharge: HOME | End: 2024-08-26
Attending: NEUROLOGICAL SURGERY
Payer: COMMERCIAL

## 2024-08-26 ENCOUNTER — HOSPITAL ENCOUNTER (OUTPATIENT)
Facility: CLINIC | Age: 49
Discharge: HOME | End: 2024-08-26
Admitting: PHYSICIAN ASSISTANT
Payer: COMMERCIAL

## 2024-08-26 VITALS — OXYGEN SATURATION: 97 % | SYSTOLIC BLOOD PRESSURE: 159 MMHG | HEART RATE: 74 BPM | DIASTOLIC BLOOD PRESSURE: 100 MMHG

## 2024-08-26 VITALS
HEART RATE: 72 BPM | RESPIRATION RATE: 16 BRPM | OXYGEN SATURATION: 98 % | DIASTOLIC BLOOD PRESSURE: 108 MMHG | SYSTOLIC BLOOD PRESSURE: 159 MMHG

## 2024-08-26 DIAGNOSIS — M54.50 LOW BACK PAIN: ICD-10-CM

## 2024-08-26 DIAGNOSIS — R79.89 ELEVATED LFTS: ICD-10-CM

## 2024-08-26 DIAGNOSIS — E78.5 HYPERLIPIDEMIA LDL GOAL <160: Primary | ICD-10-CM

## 2024-08-26 PROCEDURE — 255N000002 HC RX 255 OP 636: Performed by: PHYSICIAN ASSISTANT

## 2024-08-26 PROCEDURE — 999N000154 HC STATISTIC RADIOLOGY XRAY, US, CT, MAR, NM

## 2024-08-26 PROCEDURE — 250N000011 HC RX IP 250 OP 636: Performed by: PHYSICIAN ASSISTANT

## 2024-08-26 PROCEDURE — 62323 NJX INTERLAMINAR LMBR/SAC: CPT

## 2024-08-26 PROCEDURE — 250N000009 HC RX 250: Performed by: PHYSICIAN ASSISTANT

## 2024-08-26 RX ORDER — DEXAMETHASONE SODIUM PHOSPHATE 10 MG/ML
20 INJECTION, SOLUTION INTRAMUSCULAR; INTRAVENOUS ONCE
Status: COMPLETED | OUTPATIENT
Start: 2024-08-26 | End: 2024-08-26

## 2024-08-26 RX ORDER — IOPAMIDOL 408 MG/ML
10 INJECTION, SOLUTION INTRATHECAL ONCE
Status: COMPLETED | OUTPATIENT
Start: 2024-08-26 | End: 2024-08-26

## 2024-08-26 RX ORDER — LIDOCAINE HYDROCHLORIDE 10 MG/ML
5 INJECTION, SOLUTION EPIDURAL; INFILTRATION; INTRACAUDAL; PERINEURAL ONCE
Status: COMPLETED | OUTPATIENT
Start: 2024-08-26 | End: 2024-08-26

## 2024-08-26 RX ORDER — BETAMETHASONE SODIUM PHOSPHATE AND BETAMETHASONE ACETATE 3; 3 MG/ML; MG/ML
5 INJECTION, SUSPENSION INTRA-ARTICULAR; INTRALESIONAL; INTRAMUSCULAR; SOFT TISSUE ONCE
Status: DISCONTINUED | OUTPATIENT
Start: 2024-08-26 | End: 2024-08-26

## 2024-08-26 RX ADMIN — IOPAMIDOL 1 ML: 408 INJECTION, SOLUTION INTRATHECAL at 15:30

## 2024-08-26 RX ADMIN — DEXAMETHASONE SODIUM PHOSPHATE 20 MG: 10 INJECTION, SOLUTION INTRAMUSCULAR; INTRAVENOUS at 15:30

## 2024-08-26 RX ADMIN — LIDOCAINE HYDROCHLORIDE 4 ML: 10 INJECTION, SOLUTION EPIDURAL; INFILTRATION; INTRACAUDAL; PERINEURAL at 15:11

## 2024-08-26 ASSESSMENT — ACTIVITIES OF DAILY LIVING (ADL)
ADLS_ACUITY_SCORE: 35
ADLS_ACUITY_SCORE: 35

## 2024-08-26 NOTE — PROGRESS NOTES
Care Suites Discharge Nursing Note    Patient Information  Name: Rocael Borden  Age: 49 year old    Discharge Education:  Discharge instructions reviewed: Yes  Additional education/resources provided: NA  Patient/patient representative verbalizes understanding: Yes  Patient discharging on new medications: No  Medication education completed: N/A    Discharge Plans:   Discharge location: home  Discharge ride contacted: N/A  Approximate discharge time: 1600    Discharge Criteria:  Discharge criteria met and vital signs stable: Yes    Discharge OK by WILBER Alas.     Patient Belongs:  Patient belongings returned to patient: Yes    Taylor Saavedra RN

## 2024-08-26 NOTE — DISCHARGE INSTRUCTIONS
Steroid Injection Discharge Instructions     After you go home:    You may resume your normal diet.    Care of Puncture Site:    If you have a bandaid on your puncture site, you may remove it the next morning  You may shower tomorrow  No bath tubs, whirlpools or swimming pool for at least 48 hours  Use ice packs as needed for discomfort     Activity:    Minimize your activity today. You may gradually resume your normal activity as tolerated  Avoid vigorous or strenuous activity until your symptoms improve or as directed by your doctor  Do NOT drive a vehicle for a few hours after the injection - or longer if you develop numbness in your arm or leg    Medicines:    You may resume all medications, including blood thinners  Resume your Warfarin/Coumadin at your regular dose today. Follow up with your provider to have your INR rechecked  Resume your Platelet Inhibitors and Aspirin tomorrow at your regular dose  For minor discomfort, you may take Acetaminophen (Tylenol) or Ibuprofen (Advil)    Pain:     You may experience increased or different pain over the next 24-48 hours  For the next 48 hrs - you may use ice packs for discomfort     Call your primary care doctor if:    You have severe pain that does not improve with pain medication  You have chills or a fever greater than 101 F (38 C)  The site is red, swollen, hot or tender  Increase in pain, weakness or numbness  New problems with your bowel or bladder  Any questions or concerns    What to watch for:    It can be normal to have some bruising or slight swelling at the puncture site.   After the procedure, you may have some new weakness or numbness down your arm/leg from the numbing medicine. This should resolve in a few hours.   You may feel some temporary relief from the numbing medicine, but that will wear off within a few hours.  Your symptoms may return to pre procedure level, or can even be worse for the first 1-2 days.  For many people, the steroid begins to  provide some relief within 2-3 days, but it can take up to 2 weeks to obtain the full results.  Some people will get lasting relief from a single injection. Others may require up to 3 injections to get results. If you have more than one steroid injection, they should be given 2 weeks apart.  If you have no improvement in your symptoms after two weeks, please contact the doctor who ordered this procedure to discuss the next steps.  Side effects of your steroid injection are mild and will go away in 2-3 days  Insomnia  Irritability  Flushed face  Water retention  Restlessness  Difficulty sleeping  Increased appetite  Increased blood sugar  If you are diabetic, monitor your blood sugar closely. Contact the provider who manages your diabetes to help you control your blood sugar if needed.    If you have questions or concerns call:                  [facility] Radiology Dept @ [phone]                                    between 8am-4:30pm Mon-Fri    If you have urgent questions outside of these normal business hours, please contact the [facility] on call doctor @ [phone]    Or you can contact your provider via My Chart

## 2024-08-26 NOTE — PROCEDURES
RADIOLOGY PROCEDURE NOTE  Patient name: Rocael Borden  MRN: [id number]  : 1975    Pre-procedure diagnosis: Back and left leg pain  Post-procedure diagnosis: Same    Procedure Date/Time: 2024  3:40 PM  Procedure: L4-L5 TFESI.  Attempted IL approach on the left.  Hit bone and some venous uptake as well as the cyst within the interspinous ligament.  Decided to abort and convert to a left L4-L5 TFESI. No .  Steroid only with no anesthetic as patient wanted to leave right after the case.   Estimated blood loss: None  Specimen(s) collected with description: none  The patient tolerated the procedure well with no immediate complications.  Significant findings:none    See imaging dictation for procedural details.    Provider name: Bishop Benites PA-C  Assistant(s):None

## 2024-08-27 NOTE — PATIENT INSTRUCTIONS
Thank you for choosing us for your care. I think an in-clinic or virtual visit would be the best next step based on your symptoms. Please schedule a clinic appointment; you won t be charged for this eVisit.      You can schedule an appointment by clicking here in [employer], or call [phone].

## 2024-08-30 ENCOUNTER — MYC REFILL (OUTPATIENT)
Dept: PEDIATRICS | Facility: CLINIC | Age: 49
End: 2024-08-30

## 2024-08-30 ENCOUNTER — E-VISIT (OUTPATIENT)
Dept: PEDIATRICS | Facility: CLINIC | Age: 49
End: 2024-08-30
Payer: COMMERCIAL

## 2024-08-30 ENCOUNTER — OFFICE VISIT (OUTPATIENT)
Dept: PEDIATRICS | Facility: CLINIC | Age: 49
End: 2024-08-30
Payer: COMMERCIAL

## 2024-08-30 VITALS
SYSTOLIC BLOOD PRESSURE: 148 MMHG | OXYGEN SATURATION: 97 % | DIASTOLIC BLOOD PRESSURE: 94 MMHG | WEIGHT: 251.6 LBS | RESPIRATION RATE: 20 BRPM | HEIGHT: 73 IN | TEMPERATURE: 98 F | BODY MASS INDEX: 33.34 KG/M2 | HEART RATE: 76 BPM

## 2024-08-30 DIAGNOSIS — R82.90 ABNORMAL FINDING ON URINALYSIS: Primary | ICD-10-CM

## 2024-08-30 DIAGNOSIS — Z87.442 HISTORY OF KIDNEY STONES: ICD-10-CM

## 2024-08-30 DIAGNOSIS — M54.50 BILATERAL LOW BACK PAIN WITHOUT SCIATICA, UNSPECIFIED CHRONICITY: ICD-10-CM

## 2024-08-30 DIAGNOSIS — I10 PRIMARY HYPERTENSION: Primary | ICD-10-CM

## 2024-08-30 DIAGNOSIS — R82.90 ABNORMAL FINDING ON URINALYSIS: ICD-10-CM

## 2024-08-30 LAB
ALBUMIN UR-MCNC: NEGATIVE MG/DL
APPEARANCE UR: CLEAR
BACTERIA #/AREA URNS HPF: ABNORMAL /HPF
BILIRUB UR QL STRIP: NEGATIVE
COLOR UR AUTO: YELLOW
GLUCOSE UR STRIP-MCNC: NEGATIVE MG/DL
HGB UR QL STRIP: ABNORMAL
HYALINE CASTS #/AREA URNS LPF: ABNORMAL /LPF
KETONES UR STRIP-MCNC: NEGATIVE MG/DL
LEUKOCYTE ESTERASE UR QL STRIP: ABNORMAL
NITRATE UR QL: NEGATIVE
PH UR STRIP: 6.5 [PH] (ref 5–7)
RBC #/AREA URNS AUTO: ABNORMAL /HPF
SP GR UR STRIP: 1.02 (ref 1–1.03)
SPERM #/AREA URNS HPF: PRESENT /HPF
SQUAMOUS #/AREA URNS AUTO: ABNORMAL /LPF
UROBILINOGEN UR STRIP-ACNC: 0.2 E.U./DL
WBC #/AREA URNS AUTO: ABNORMAL /HPF

## 2024-08-30 PROCEDURE — 81001 URINALYSIS AUTO W/SCOPE: CPT | Performed by: PHYSICIAN ASSISTANT

## 2024-08-30 PROCEDURE — 99214 OFFICE O/P EST MOD 30 MIN: CPT | Performed by: PHYSICIAN ASSISTANT

## 2024-08-30 PROCEDURE — 87086 URINE CULTURE/COLONY COUNT: CPT | Performed by: PHYSICIAN ASSISTANT

## 2024-08-30 PROCEDURE — 99207 PR NON-BILLABLE SERV PER CHARTING: CPT | Performed by: PHYSICIAN ASSISTANT

## 2024-08-30 RX ORDER — LOSARTAN POTASSIUM 25 MG/1
25 TABLET ORAL DAILY
Qty: 30 TABLET | Refills: 1 | Status: SHIPPED | OUTPATIENT
Start: 2024-08-30

## 2024-08-30 RX ORDER — TAMSULOSIN HYDROCHLORIDE 0.4 MG/1
0.4 CAPSULE ORAL DAILY
Qty: 20 CAPSULE | Refills: 0 | Status: SHIPPED | OUTPATIENT
Start: 2024-08-30

## 2024-08-30 RX ORDER — METHOCARBAMOL 750 MG/1
750 TABLET, FILM COATED ORAL 3 TIMES DAILY
Qty: 15 TABLET | Refills: 0 | Status: SHIPPED | OUTPATIENT
Start: 2024-08-30

## 2024-08-30 ASSESSMENT — PAIN SCALES - GENERAL: PAINLEVEL: NO PAIN (0)

## 2024-08-30 ASSESSMENT — ENCOUNTER SYMPTOMS: BACK PAIN: 1

## 2024-08-30 NOTE — PROGRESS NOTES
Assessment & Plan     Primary hypertension    - losartan (COZAAR) 25 MG tablet; Take 1 tablet (25 mg) by mouth daily.    Bilateral low back pain without sciatica, unspecified chronicity    - methocarbamol (ROBAXIN) 750 MG tablet; Take 1 tablet (750 mg) by mouth 3 times daily.    History of kidney stones    - UA Macroscopic with reflex to Microscopic and Culture - Clinic Collect  - UA Microscopic with Reflex to Culture      Patient Plan:  We will have you start Losartan for the high blood pressure.      Please followup in about two weeks, on Sept 16th with cathleen Farris for a recheck and labs.  Please be seen sooner if needed.        Close followup has been advised.  Patient understands and agrees with the plan today.        Javon Cote is a 49 year old, presenting for the following health issues:  Back Pain        8/30/2024     9:41 AM   Additional Questions   Roomed by Lynnette CORDOVA   Accompanied by None         8/30/2024     9:43 AM   Patient Reported Additional Medications   Patient reports taking the following new medications stopped bp med     Back Pain     History of Present Illness       Back Pain:  He presents for follow up of back pain. Patient's back pain is a recurring problem.  Location of back pain:  Left lower back, left buttock and left hip  Description of back pain: dull ache  Back pain spreads: left buttocks and left thigh    Since patient first noticed back pain, pain is: always present, but gets better and worse  Does back pain interfere with his job:  No       Hypertension: He presents for follow up of hypertension.  He does check blood pressure  regularly outside of the clinic. Outside blood pressures have been over 140/90. He does not follow a low salt diet.     Reason for visit:  Kidney stones    He eats 2-3 servings of fruits and vegetables daily.He consumes 2 sweetened beverage(s) daily.He exercises with enough effort to increase his heart rate 9 or less minutes per day.  He exercises  "with enough effort to increase his heart rate 3 or less days per week.   He is taking medications regularly.     Patient stopped taking hydrochlorothiazide about a week ago due to spike in calcium levels. Now bp has increased. Patient has history of kidney stones, had two kidney stones last week and taking tamsulosin prn for this (Friday-Monday and Thursday-Saturday of last week). Patient is also dealing with pinched nerve between L4 and L5, had cortisone shot Monday which has significantly improved the pain.     Patient is requesting methocarbamol script for back pain.    Got a cortisone shot this week Monday, pain improved.  Would like muscle relaxer to have on hand for pain break through pain after more activity.      Overall pain is improving.  Not concerned of the kidney stones now.  Denies blood in his urine and denies any urinary complaints at this time.      Also wanting to address the high blood pressure as his diuretic was stopped.          Review of Systems  Constitutional, neuro, ENT, endocrine, pulmonary, cardiac, gastrointestinal, genitourinary, musculoskeletal, integument and psychiatric systems are negative, except as otherwise noted.      Objective    BP (!) 155/105 (BP Location: Right arm, Patient Position: Sitting, Cuff Size: Adult Large)   Pulse 76   Temp 98  F (36.7  C) (Tympanic)   Resp 20   Ht 1.859 m (6' 1.2\")   Wt 114.1 kg (251 lb 9.6 oz)   SpO2 97%   BMI 33.01 kg/m    Body mass index is 33.01 kg/m .  Physical Exam   GENERAL: alert and no distress  EYES: Eyes grossly normal to inspection, PERRL and conjunctivae and sclerae normal  NECK: no adenopathy, no asymmetry, masses, or scars  RESP: lungs clear to auscultation - no rales, rhonchi or wheezes  CV: regular rate and rhythm, normal S1 S2, no S3 or S4, no murmur, click or rub, no peripheral edema  MS: no gross musculoskeletal defects noted, no edema          Signed Electronically by: Ninoska Hewitt PA-C    "

## 2024-08-30 NOTE — TELEPHONE ENCOUNTER
Provider E-Visit time total (minutes): 3    Spoke with patient.  We are awaiting urine culture.  He is pretty much symptomatic.  UA was collected for history of stones.  Will wait to treat based on culture results.  Patient understands and agrees with the plan today.

## 2024-08-30 NOTE — PATIENT INSTRUCTIONS
We will have you start Losartan for the high blood pressure.      Please followup in about two weeks, on Sept 16th with cathleen Farris for a recheck and labs.  Please be seen sooner if needed.

## 2024-08-30 NOTE — TELEPHONE ENCOUNTER
Provider E-Visit time total (minutes): 2    Called patient and plan in place.  Awaiting Culture results.

## 2024-09-01 LAB — BACTERIA UR CULT: NO GROWTH

## 2024-09-02 NOTE — RESULT ENCOUNTER NOTE
Juan Cote ,    The results from your recent lab work are within normal limits.    The urine culture was negative and therefore there is no need to treat for an infection.      Thank you for choosing [facility] for your health care needs,      Ninoska Hewitt PA-C

## 2024-10-29 ENCOUNTER — MYC REFILL (OUTPATIENT)
Dept: PEDIATRICS | Facility: CLINIC | Age: 49
End: 2024-10-29
Payer: COMMERCIAL

## 2024-10-29 DIAGNOSIS — M54.50 BILATERAL LOW BACK PAIN WITHOUT SCIATICA, UNSPECIFIED CHRONICITY: ICD-10-CM

## 2024-10-29 DIAGNOSIS — I10 PRIMARY HYPERTENSION: ICD-10-CM

## 2024-10-29 RX ORDER — METHOCARBAMOL 750 MG/1
750 TABLET, FILM COATED ORAL 3 TIMES DAILY
Qty: 15 TABLET | Refills: 0 | Status: SHIPPED | OUTPATIENT
Start: 2024-10-29 | End: 2024-11-09

## 2024-10-29 RX ORDER — LOSARTAN POTASSIUM 25 MG/1
25 TABLET ORAL DAILY
Qty: 30 TABLET | Refills: 1 | Status: SHIPPED | OUTPATIENT
Start: 2024-10-29

## 2024-10-29 NOTE — TELEPHONE ENCOUNTER
Please have patient come in for lab work that is due and also BP check.    Does not need to see a provider.

## 2024-11-01 ENCOUNTER — ALLIED HEALTH/NURSE VISIT (OUTPATIENT)
Dept: PEDIATRICS | Facility: CLINIC | Age: 49
End: 2024-11-01
Payer: COMMERCIAL

## 2024-11-01 ENCOUNTER — LAB (OUTPATIENT)
Dept: LAB | Facility: CLINIC | Age: 49
End: 2024-11-01
Payer: COMMERCIAL

## 2024-11-01 DIAGNOSIS — R10.9 FLANK PAIN: ICD-10-CM

## 2024-11-01 DIAGNOSIS — E83.52 SERUM CALCIUM ELEVATED: ICD-10-CM

## 2024-11-01 DIAGNOSIS — Z87.442 HISTORY OF KIDNEY STONES: ICD-10-CM

## 2024-11-01 DIAGNOSIS — Z01.30 BP CHECK: Primary | ICD-10-CM

## 2024-11-01 LAB
ALBUMIN UR-MCNC: ABNORMAL MG/DL
ANION GAP SERPL CALCULATED.3IONS-SCNC: 9 MMOL/L (ref 7–15)
APPEARANCE UR: CLEAR
BACTERIA #/AREA URNS HPF: ABNORMAL /HPF
BILIRUB UR QL STRIP: NEGATIVE
BUN SERPL-MCNC: 9.2 MG/DL (ref 6–20)
CA-I BLD-MCNC: 5.1 MG/DL (ref 4.4–5.2)
CALCIUM SERPL-MCNC: 10.3 MG/DL (ref 8.8–10.4)
CHLORIDE SERPL-SCNC: 101 MMOL/L (ref 98–107)
COLOR UR AUTO: YELLOW
CREAT SERPL-MCNC: 0.79 MG/DL (ref 0.67–1.17)
EGFRCR SERPLBLD CKD-EPI 2021: >90 ML/MIN/1.73M2
GLUCOSE SERPL-MCNC: 105 MG/DL (ref 70–99)
GLUCOSE UR STRIP-MCNC: NEGATIVE MG/DL
HCO3 SERPL-SCNC: 29 MMOL/L (ref 22–29)
HGB UR QL STRIP: ABNORMAL
KETONES UR STRIP-MCNC: NEGATIVE MG/DL
LEUKOCYTE ESTERASE UR QL STRIP: ABNORMAL
MUCOUS THREADS #/AREA URNS LPF: PRESENT /LPF
NITRATE UR QL: NEGATIVE
PH UR STRIP: 6.5 [PH] (ref 5–7)
POTASSIUM SERPL-SCNC: 4.3 MMOL/L (ref 3.4–5.3)
RBC #/AREA URNS AUTO: ABNORMAL /HPF
SODIUM SERPL-SCNC: 139 MMOL/L (ref 135–145)
SP GR UR STRIP: 1.02 (ref 1–1.03)
SQUAMOUS #/AREA URNS AUTO: ABNORMAL /LPF
UROBILINOGEN UR STRIP-ACNC: 4 E.U./DL
WBC #/AREA URNS AUTO: ABNORMAL /HPF
WBC CLUMPS #/AREA URNS HPF: PRESENT /HPF

## 2024-11-01 PROCEDURE — 87086 URINE CULTURE/COLONY COUNT: CPT

## 2024-11-01 PROCEDURE — 81001 URINALYSIS AUTO W/SCOPE: CPT

## 2024-11-01 PROCEDURE — 36415 COLL VENOUS BLD VENIPUNCTURE: CPT

## 2024-11-01 PROCEDURE — 82330 ASSAY OF CALCIUM: CPT

## 2024-11-01 PROCEDURE — 99207 PR NO CHARGE NURSE ONLY: CPT

## 2024-11-01 PROCEDURE — 80048 BASIC METABOLIC PNL TOTAL CA: CPT

## 2024-11-01 NOTE — PROGRESS NOTES
Rocael Borden is a 49 year old year old patient who comes in today for a Blood Pressure check because of new medication.  Vital Signs as taken by Alcides RAMIREZ RN.  Patient is taking medication as prescribed- losartan 25 mg daily. Pt reports taking losartan at 0800 today.  Patient is tolerating medications well.   Pt denies vision changes, SOB, CP, dizziness or other symptoms/side effects.   Patient is monitoring Blood Pressure at home.  Average readings if yes are unknown- fluctuating per pt.  Current complaints: none  Disposition:  patient instructed to go to lab for lab draw. Routing encounter to PCP for review and recommendations. Pt sent home, advised to call/seek care for increasing BP or new symptoms. Red flag symptoms reviewed.     VS at visit:  HR 77 bpm  /97, 138/102 sitting right arm, 138/93 sitting left arm   SpO2: 95%    Advised pt he will be contacted with provider follow-up recommendations. Pt has no preference on mychart vs phone call for updates/recommendations.     Alcides Bonilla RN on 11/1/2024 at 11:04 AM

## 2024-11-02 LAB — BACTERIA UR CULT: NO GROWTH

## 2024-11-09 ENCOUNTER — MYC REFILL (OUTPATIENT)
Dept: PEDIATRICS | Facility: CLINIC | Age: 49
End: 2024-11-09
Payer: COMMERCIAL

## 2024-11-09 DIAGNOSIS — M54.50 BILATERAL LOW BACK PAIN WITHOUT SCIATICA, UNSPECIFIED CHRONICITY: ICD-10-CM

## 2024-11-11 ENCOUNTER — TELEPHONE (OUTPATIENT)
Dept: PEDIATRICS | Facility: CLINIC | Age: 49
End: 2024-11-11
Payer: COMMERCIAL

## 2024-11-11 DIAGNOSIS — I10 PRIMARY HYPERTENSION: Primary | ICD-10-CM

## 2024-11-11 RX ORDER — METHOCARBAMOL 750 MG/1
750 TABLET, FILM COATED ORAL 3 TIMES DAILY
Qty: 15 TABLET | Refills: 0 | Status: SHIPPED | OUTPATIENT
Start: 2024-11-11

## 2024-11-11 NOTE — TELEPHONE ENCOUNTER
Nhung Farris NP [facility]  Please call patient and go over result note.    Thanks!  Nhung Cote,     Thanks for repeating your labs and coming in for a repeat blood pressure check.     -Your blood pressure is elevated and it looks like at home you are getting variable readings.  You are currently taking losartan 25mg.  Let's double that to 50mg.  I will put an updated prescription at your pharmacy.     -Your urine showed blood and urobilinogen.  Any symptom of a kidney stone?   Are you following a urologist?  Urobilinogen is typically seen in dehydration and/or liver disease.  You just had your colonoscopy with [facility] but did you follow-up regarding your liver?  I did not see a consult come in from them.     Please let me know if you have any questions,     ALANNA Farris DNP  Family Medicine   Written by Nhung Farris NP on 11/4/2024  9:36 AM CST

## 2024-11-11 NOTE — TELEPHONE ENCOUNTER
Attempt #1: Called and left voice message for patient to call [phone] and ask to speak to a nurse. Also sent patient [employer].    Upon call back please  -relay provider message below  -inquire about kidney stone symptoms  -inquire if follow up regarding liver    Awaiting call back at this time.    Daria Ku RN

## 2024-11-11 NOTE — TELEPHONE ENCOUNTER
"Patient reports he has frequent kidney stones.  Patient did follow up on liver \"last year\" \"It's just fatty tissue\".  Patient agrees to increase losartan to 50mg. Order pended for provider review.  "

## 2024-11-13 RX ORDER — LOSARTAN POTASSIUM 50 MG/1
50 TABLET ORAL DAILY
Qty: 90 TABLET | Refills: 1 | Status: SHIPPED | OUTPATIENT
Start: 2024-11-13

## 2024-11-18 NOTE — PROGRESS NOTES
Recently increase losartan to 50mg to help control blood pressure.    If patient can come in for repeat blood pressure check in the next 2-4 weeks that would be great.  Otherwise if he can check in a pharmacy that works as well.    Thanks,  ALANNA Farris, DNP

## 2024-12-13 ENCOUNTER — MYC MEDICAL ADVICE (OUTPATIENT)
Dept: PEDIATRICS | Facility: CLINIC | Age: 49
End: 2024-12-13
Payer: COMMERCIAL

## 2024-12-13 DIAGNOSIS — I10 PRIMARY HYPERTENSION: Primary | ICD-10-CM

## 2024-12-16 RX ORDER — LOSARTAN POTASSIUM 100 MG/1
100 TABLET ORAL DAILY
Qty: 90 TABLET | Refills: 0 | Status: SHIPPED | OUTPATIENT
Start: 2024-12-16

## 2024-12-31 ENCOUNTER — MYC REFILL (OUTPATIENT)
Dept: PEDIATRICS | Facility: CLINIC | Age: 49
End: 2024-12-31
Payer: COMMERCIAL

## 2024-12-31 DIAGNOSIS — E78.5 HYPERLIPIDEMIA LDL GOAL <160: ICD-10-CM

## 2024-12-31 RX ORDER — EZETIMIBE 10 MG/1
10 TABLET ORAL DAILY
Qty: 90 TABLET | Refills: 1 | Status: SHIPPED | OUTPATIENT
Start: 2024-12-31

## 2025-02-10 ENCOUNTER — MYC REFILL (OUTPATIENT)
Dept: PEDIATRICS | Facility: CLINIC | Age: 50
End: 2025-02-10
Payer: COMMERCIAL

## 2025-02-10 DIAGNOSIS — M54.50 BILATERAL LOW BACK PAIN WITHOUT SCIATICA, UNSPECIFIED CHRONICITY: ICD-10-CM

## 2025-02-10 RX ORDER — METHOCARBAMOL 750 MG/1
750 TABLET, FILM COATED ORAL 3 TIMES DAILY
Qty: 15 TABLET | Refills: 0 | Status: SHIPPED | OUTPATIENT
Start: 2025-02-10

## 2025-03-02 ENCOUNTER — MYC REFILL (OUTPATIENT)
Dept: PEDIATRICS | Facility: CLINIC | Age: 50
End: 2025-03-02
Payer: COMMERCIAL

## 2025-03-02 DIAGNOSIS — M54.50 BILATERAL LOW BACK PAIN WITHOUT SCIATICA, UNSPECIFIED CHRONICITY: ICD-10-CM

## 2025-03-03 RX ORDER — METHOCARBAMOL 750 MG/1
750 TABLET, FILM COATED ORAL 3 TIMES DAILY
Qty: 15 TABLET | Refills: 0 | Status: SHIPPED | OUTPATIENT
Start: 2025-03-03

## 2025-03-23 ENCOUNTER — MYC REFILL (OUTPATIENT)
Dept: PEDIATRICS | Facility: CLINIC | Age: 50
End: 2025-03-23
Payer: COMMERCIAL

## 2025-03-23 DIAGNOSIS — E78.5 HYPERLIPIDEMIA LDL GOAL <160: ICD-10-CM

## 2025-03-24 ENCOUNTER — MYC REFILL (OUTPATIENT)
Dept: PEDIATRICS | Facility: CLINIC | Age: 50
End: 2025-03-24
Payer: COMMERCIAL

## 2025-03-24 DIAGNOSIS — M54.50 BILATERAL LOW BACK PAIN WITHOUT SCIATICA, UNSPECIFIED CHRONICITY: ICD-10-CM

## 2025-03-24 RX ORDER — METHOCARBAMOL 750 MG/1
750 TABLET, FILM COATED ORAL 3 TIMES DAILY
Qty: 15 TABLET | Refills: 0 | Status: SHIPPED | OUTPATIENT
Start: 2025-03-24

## 2025-03-24 RX ORDER — EZETIMIBE 10 MG/1
10 TABLET ORAL DAILY
Qty: 90 TABLET | Refills: 1 | Status: SHIPPED | OUTPATIENT
Start: 2025-03-24

## 2025-04-07 ENCOUNTER — TELEPHONE (OUTPATIENT)
Dept: OPHTHALMOLOGY | Facility: CLINIC | Age: 50
End: 2025-04-07
Payer: COMMERCIAL

## 2025-04-07 ENCOUNTER — TELEPHONE (OUTPATIENT)
Dept: OTOLARYNGOLOGY | Facility: CLINIC | Age: 50
End: 2025-04-07
Payer: COMMERCIAL

## 2025-04-07 ENCOUNTER — MYC REFILL (OUTPATIENT)
Dept: PEDIATRICS | Facility: CLINIC | Age: 50
End: 2025-04-07
Payer: COMMERCIAL

## 2025-04-07 DIAGNOSIS — M54.50 BILATERAL LOW BACK PAIN WITHOUT SCIATICA, UNSPECIFIED CHRONICITY: ICD-10-CM

## 2025-04-07 DIAGNOSIS — I10 PRIMARY HYPERTENSION: ICD-10-CM

## 2025-04-07 NOTE — TELEPHONE ENCOUNTER
"Spoke with patient regarding scheduling a TECH ONLY Appointment for: VFTs- Per Dr. Horn patient for PA \"upper bleph covered under insurance.\" . Scheduled patient accordingly and provided appointment details over the phone. Additionally, patient will see appointment in [facility].-Appt Per PT   "

## 2025-04-07 NOTE — TELEPHONE ENCOUNTER
"Spoke with patient regarding scheduling a TECH ONLY Appointment for: VFTs- Per Dr. Horn patient for PA \"upper bleph covered under insurance.\" . Scheduled patient accordingly and provided appointment details over the phone. Additionally, patient will see appointment in [facility].-Appt Per PT   "

## 2025-04-08 RX ORDER — METHOCARBAMOL 750 MG/1
750 TABLET, FILM COATED ORAL 3 TIMES DAILY
Qty: 15 TABLET | Refills: 0 | Status: SHIPPED | OUTPATIENT
Start: 2025-04-08

## 2025-04-08 RX ORDER — LOSARTAN POTASSIUM 100 MG/1
100 TABLET ORAL DAILY
Qty: 90 TABLET | Refills: 0 | Status: SHIPPED | OUTPATIENT
Start: 2025-04-08

## 2025-04-09 ENCOUNTER — MYC MEDICAL ADVICE (OUTPATIENT)
Dept: PEDIATRICS | Facility: CLINIC | Age: 50
End: 2025-04-09
Payer: COMMERCIAL

## 2025-04-09 NOTE — TELEPHONE ENCOUNTER
See CH[employer] message.     Replied via CH4[employer].     Elsa HOUGH RN   Clinic RN  [facility]

## 2025-04-11 ENCOUNTER — ALLIED HEALTH/NURSE VISIT (OUTPATIENT)
Dept: OPHTHALMOLOGY | Facility: CLINIC | Age: 50
End: 2025-04-11
Payer: COMMERCIAL

## 2025-04-11 DIAGNOSIS — H02.831 DERMATOCHALASIS OF BOTH UPPER EYELIDS: Primary | ICD-10-CM

## 2025-04-11 DIAGNOSIS — H02.834 DERMATOCHALASIS OF BOTH UPPER EYELIDS: Primary | ICD-10-CM

## 2025-04-11 PROCEDURE — 99207 PR NO CHARGE LOS: CPT

## 2025-04-11 PROCEDURE — 92081 LIMITED VISUAL FIELD XM: CPT

## 2025-04-11 NOTE — NURSING NOTE
Chief Complaints and History of Present Illnesses   Patient presents with    Tech only visit     Patient present for a TECH only PVF per Dr Horn.     Chief Complaint(s) and History of Present Illness(es)       Tech only visit              Comments: Patient present for a TECH only PVF per Dr Horn.              Comments    MARCOS Lara COT 2:09 PM April 11, 2025

## 2025-04-15 ENCOUNTER — MYC REFILL (OUTPATIENT)
Dept: PEDIATRICS | Facility: CLINIC | Age: 50
End: 2025-04-15
Payer: COMMERCIAL

## 2025-04-15 ENCOUNTER — PREP FOR PROCEDURE (OUTPATIENT)
Dept: OTOLARYNGOLOGY | Facility: CLINIC | Age: 50
End: 2025-04-15
Payer: COMMERCIAL

## 2025-04-15 DIAGNOSIS — H02.836 DERMATOCHALASIS OF BOTH EYELIDS: Primary | ICD-10-CM

## 2025-04-15 DIAGNOSIS — M54.50 BILATERAL LOW BACK PAIN WITHOUT SCIATICA, UNSPECIFIED CHRONICITY: ICD-10-CM

## 2025-04-15 DIAGNOSIS — H02.833 DERMATOCHALASIS OF BOTH EYELIDS: Primary | ICD-10-CM

## 2025-04-15 RX ORDER — CLINDAMYCIN PHOSPHATE 900 MG/50ML
900 INJECTION, SOLUTION INTRAVENOUS
OUTPATIENT
Start: 2025-04-15

## 2025-04-15 RX ORDER — CLINDAMYCIN PHOSPHATE 900 MG/50ML
900 INJECTION, SOLUTION INTRAVENOUS SEE ADMIN INSTRUCTIONS
OUTPATIENT
Start: 2025-04-15

## 2025-04-16 RX ORDER — METHOCARBAMOL 750 MG/1
750 TABLET, FILM COATED ORAL 3 TIMES DAILY
Qty: 15 TABLET | Refills: 0 | Status: SHIPPED | OUTPATIENT
Start: 2025-04-16

## 2025-04-22 ENCOUNTER — TELEPHONE (OUTPATIENT)
Dept: PLASTIC SURGERY | Facility: CLINIC | Age: 50
End: 2025-04-22

## 2025-04-22 NOTE — TELEPHONE ENCOUNTER
Called patient to inform him that his surgery with Dr. Horn has been approved.     Left voicemail.     Loly Garcia, Surgical Coordinator   4/22/2025

## 2025-04-22 NOTE — TELEPHONE ENCOUNTER
Patient has been scheduled for surgery with Dr. Horn.     Spoke with: Patient     Location: [facility] OR    Date: 5/5/2025    H&P: Patient is aware he will need an H&P within 30 days of scheduled surgery date.     Surgery Packet: Emailed packet on 4/22/2025    Loly Garcia, Surgical Coordinator  4/22/2025

## 2025-05-08 ENCOUNTER — TRANSFERRED RECORDS (OUTPATIENT)
Dept: HEALTH INFORMATION MANAGEMENT | Facility: CLINIC | Age: 50
End: 2025-05-08

## 2025-05-08 ENCOUNTER — PATIENT OUTREACH (OUTPATIENT)
Dept: CARE COORDINATION | Facility: CLINIC | Age: 50
End: 2025-05-08
Payer: COMMERCIAL

## 2025-05-08 ENCOUNTER — TRANSFERRED RECORDS (OUTPATIENT)
Dept: ADMINISTRATIVE | Facility: CLINIC | Age: 50
End: 2025-05-08
Payer: COMMERCIAL

## 2025-05-09 ENCOUNTER — TRANSFERRED RECORDS (OUTPATIENT)
Dept: HEALTH INFORMATION MANAGEMENT | Facility: CLINIC | Age: 50
End: 2025-05-09

## 2025-05-12 NOTE — PROCEDURES
[address], [address], [place] [address]     Patient Name: Rocael Borden  Gender:  Male  Exam Date: 05/08/2025 Visit Number:  [id number]  Age: 50 Years YOB: 1975  Attending MD: Dia Johnson MD Medical Record#:  [id number]    Procedure: Colonoscopy   Indications: Nine colon adenomas removed in 2024      Referring MD: Referral Self  Primary MD:      Nakia Santoro CNP  Medications: Admitting Medications:   0.9% Normal Saline at TKO   Intra Procedure Medications:   Patient received monitored anesthesia care.     Complications: No immediate complications  ______________________________________________________________________________  Procedure:   An examination of the heart and lungs was performed and found to be within acceptable limits.  .  The patient was therefore deemed a reasonable candidate for endoscopy and sedation.   The risks and benefits of the procedure were explained to the patient.After obtaining informed consent, the patient received monitored anesthesia care and I passed the scope   without difficulty via the rectum to the cecum.  The appendiceal orifice and ic valve were identified.  The scope was retroflexed during the examination  The quality of the prep was fair  (Miralax/Gatorade/2 tablets Bisacodyl/Magnesium Citrate).    This was a complete examination throughout the entire colon.    Findings:    Polyp location: cecum.  Quantity: 1.  Size: 2 mm.  Polyp shape:  sessile.         Maneuver: polypectomy was performed with a cold biopsy forceps.       Removal:  complete.  Retrieval: complete.  Bleeding: none.    Polyp location: ascending colon.  Quantity: 2.  Size:  2 mm, 4 mm.  Polyp shape: sessile.         Maneuver: polypectomy was performed with a  cold snare and cold biopsy forceps.       Removal: complete.  Retrieval: complete.  Bleeding: none.    Diverticulosis.  Location: - ascending colon - transverse colon - descending colon.    Description:   mild.    Diverticulosis.  Location: - sigmoid.    Description:  moderate.    The stalk of the prior pedunculated sigmoid colon polyp removed in 2024 appeared erythematous.  Multiple biopsies taken of this stalk/fold.  There was a tattoo just distal to this site.    Hemorrhoids.  External hemorrhoids without bleeding.    Impression:  Colorectal polyps  Personal history of colonic polyps  Diverticulosis of colon    Preliminary Plan:  The patient and their physician will receive a copy of the pathology report as well as pathology-based recommendations for future screening or surveillance.  Comments:  Double prep prior to future colonoscopies.  Pathology Results:  A: COLON, ASCENDING, POLYPS:           1. Tubular adenomas (2)           2. Negative for high grade dysplasia           3. Per the colonoscopy report:               a. Polyp sizes: 2 mm and 4 mm               b. Resection: Complete               c. Retrieval: Complete      B: COLON, CECUM, POLYP:           1. Tubular adenoma           2. Negative for high grade dysplasia           3. Per the colonoscopy report:               a. Polyp size: 2 mm               b. Resection: Complete               c. Retrieval: Complete      C: COLON, SIGMOID, PRIOR POLYPECTOMY SITE, BIOPSY:           1. Colonic mucosa with nonspecific reactive changes, compatible with polypectomy site           2. Negative for dysplasia and serrated change      MICROSCOPIC  A: Performed   B: Performed   C: Performed     Electronically signed by: Rian Jean-Baptiste MD    Interpreted at [facility], [address] [place] [id number]    Orders    Follow-up visit/Referral:  Order Comments   referred to Medical genetics >10 colon adenomas removed over life      If your health care provider has asked for a follow-up visit, your appointment will be scheduled with a nurse practitioner or physician assistant on your provider's care team, unless noted above.      Final  Plan:  Repeat colonoscopy in 3 years.    We will attempt to contact you at appropriate intervals via U.S. mail.  We may not be able to find you or contact you at that time, therefore you should know that the responsibility for following our recommendation rests with you.  If you don't hear from us at the time your procedure is due, please contact our office to schedule an appointment.  If your contact information should change, please contact our office so that we can update your record.  Additional Comments:  Please schedule an appointment with a medical genetics counselor soon for further evaluation since you have had more than 10 precancerous colon polyps removed over your lifetime. Double prep prior to future colonoscopies.      _Electronically signed by:___________________  Dia Johnson MD                 05/08/2025    cc: Nakia Santoro CNP

## 2025-05-14 ENCOUNTER — MYC REFILL (OUTPATIENT)
Dept: PEDIATRICS | Facility: CLINIC | Age: 50
End: 2025-05-14
Payer: COMMERCIAL

## 2025-05-14 DIAGNOSIS — M54.50 BILATERAL LOW BACK PAIN WITHOUT SCIATICA, UNSPECIFIED CHRONICITY: ICD-10-CM

## 2025-05-15 RX ORDER — METHOCARBAMOL 750 MG/1
750 TABLET, FILM COATED ORAL 3 TIMES DAILY
Qty: 15 TABLET | Refills: 0 | Status: SHIPPED | OUTPATIENT
Start: 2025-05-15

## 2025-05-29 ENCOUNTER — OFFICE VISIT (OUTPATIENT)
Dept: PEDIATRICS | Facility: CLINIC | Age: 50
End: 2025-05-29
Payer: COMMERCIAL

## 2025-05-29 VITALS
BODY MASS INDEX: 32.74 KG/M2 | WEIGHT: 247 LBS | DIASTOLIC BLOOD PRESSURE: 84 MMHG | HEART RATE: 75 BPM | OXYGEN SATURATION: 97 % | TEMPERATURE: 97.3 F | SYSTOLIC BLOOD PRESSURE: 136 MMHG | RESPIRATION RATE: 18 BRPM | HEIGHT: 73 IN

## 2025-05-29 DIAGNOSIS — H02.403 PTOSIS OF BOTH EYELIDS: ICD-10-CM

## 2025-05-29 DIAGNOSIS — Z01.818 PREOP GENERAL PHYSICAL EXAM: Primary | ICD-10-CM

## 2025-05-29 DIAGNOSIS — R79.89 ELEVATED LFTS: ICD-10-CM

## 2025-05-29 DIAGNOSIS — I10 PRIMARY HYPERTENSION: ICD-10-CM

## 2025-05-29 DIAGNOSIS — E78.5 HYPERLIPIDEMIA LDL GOAL <160: ICD-10-CM

## 2025-05-29 ASSESSMENT — PAIN SCALES - GENERAL: PAINLEVEL_OUTOF10: NO PAIN (0)

## 2025-05-29 NOTE — PROGRESS NOTES
Preoperative Evaluation  [facility] [place]  [address]  [address]  [place] [address]  Phone: [phone]  Fax: [phone]  Primary Provider: Nhung Farris NP  Pre-op Performing Provider: Elsa Cohen MD  May 29, 2025             5/29/2025   Surgical Information   What procedure is being done? eye lid lift bilateral   Facility or Hospital where procedure/surgery will be performed: [facility]   Who is doing the procedure / surgery? Dr. Horn   Date of surgery / procedure: June 2 2025   Time of surgery / procedure: 1230   Where do you plan to recover after surgery? at home with family     Fax number for surgical facility: [phone]    Assessment & Plan     The proposed surgical procedure is considered LOW risk.    Preop general physical exam  Ptosis of both eyelids  Patient medically optimized to proceed with above surgery.   - Basic metabolic panel  (Ca, Cl, CO2, Creat, Gluc, K, Na, BUN)    Primary hypertension  BP well controlled - he will hold losartan morning of surgery as he is not sure exact type of anesthesia he will receive - thinks it's a pill that will sedate him but not fully knock him out.   - Basic metabolic panel  (Ca, Cl, CO2, Creat, Gluc, K, Na, BUN)    Hyperlipidemia LDL goal <160  Elevated LFTs  Ordered per PCP  - Hepatic panel (Albumin, ALT, AST, Bili, Alk Phos, TP)              - No identified additional risk factors other than previously addressed    Antiplatelet or Anticoagulation Medication Instructions   - We reviewed the medication list and the patient is not on an antiplatelet or anticoagulation medications.    Additional Medication Instructions  Take all scheduled medications on the day of surgery EXCEPT for modifications listed below:   - ACE/ARB/ARNI (lisinopril, enalapril, losartan, valsartan, olmesartan, sacubritril/valsartan) : DO NOT TAKE on day of surgery (minimum 11 hours for general anesthesia).    Recommendation  Approval  given to proceed with proposed procedure, without further diagnostic evaluation.        Javon Cote is a 50 year old, presenting for the following:  Pre-Op Exam          5/29/2025    12:36 PM   Additional Questions   Roomed by Meme   Accompanied by none         5/29/2025    12:36 PM   Patient Reported Additional Medications   Patient reports taking the following new medications none     HPI: planning for bilateral upper eyelift for ptosis correction.           5/29/2025   Pre-Op Questionnaire   Have you ever had a heart attack or stroke? No   Have you ever had surgery on your heart or blood vessels, such as a stent placement, a coronary artery bypass, or surgery on an artery in your head, neck, heart, or legs? No   Do you have chest pain with activity? No   Do you have a history of heart failure? No   Do you currently have a cold, bronchitis or symptoms of other infection? No   Do you have a cough, shortness of breath, or wheezing? No   Do you or anyone in your family have previous history of blood clots? No   Do you or does anyone in your family have a serious bleeding problem such as prolonged bleeding following surgeries or cuts? No   Have you ever had problems with anemia or been told to take iron pills? No   Have you had any abnormal blood loss such as black, tarry or bloody stools? No   Have you ever had a blood transfusion? No   Are you willing to have a blood transfusion if it is medically needed before, during, or after your surgery? Yes   Have you or any of your relatives ever had problems with anesthesia? (!) YES - had to be woken up early from colonoscopy due to coughing.    Do you have sleep apnea, excessive snoring or daytime drowsiness? No   Do you have any artifical heart valves or other implanted medical devices like a pacemaker, defibrillator, or continuous glucose monitor? No   Do you have artificial joints? No   Are you allergic to latex? No     Advance Care Planning    Patient states has  Health Care Directive and will send to [facility].    Preoperative Review of    reviewed - no record of controlled substances prescribed.      Status of Chronic Conditions:  See problem list for active medical problems.  Problems all longstanding and stable, except as noted/documented.  See ROS for pertinent symptoms related to these conditions.    Patient Active Problem List    Diagnosis Date Noted    History of kidney stones 05/22/2024     Priority: Medium    Impingement syndrome, shoulder, right 07/24/2018     Priority: Medium    Acute pain of right shoulder 07/24/2018     Priority: Medium    Erectile dysfunction, unspecified erectile dysfunction type 01/11/2016     Priority: Medium    Hyperlipidemia LDL goal <160 03/14/2012     Priority: Medium    Obesity (BMI 30-39.9) 03/14/2012     Priority: Medium    Lentigo 03/14/2012     Priority: Medium    Knee pain 03/14/2012     Priority: Medium    Skin Tags 06/25/2010     Priority: Medium      Past Medical History:   Diagnosis Date    Erectile dysfunction, unspecified erectile dysfunction type 01/11/2016    NO ACTIVE PROBLEMS      Past Surgical History:   Procedure Laterality Date    NO HISTORY OF SURGERY       Current Outpatient Medications   Medication Sig Dispense Refill    ezetimibe (ZETIA) 10 MG tablet Take 1 tablet (10 mg) by mouth daily. 90 tablet 1    losartan (COZAAR) 100 MG tablet Take 1 tablet (100 mg) by mouth daily. 90 tablet 0    methocarbamol (ROBAXIN) 750 MG tablet Take 1 tablet (750 mg) by mouth 3 times daily. 15 tablet 0    tamsulosin (FLOMAX) 0.4 MG capsule Take 1 capsule (0.4 mg) by mouth daily. At onset of flank pain for kidney stone 20 capsule 0       Allergies   Allergen Reactions    Pcn [Penicillins] Swelling        Social History     Tobacco Use    Smoking status: Some Days     Types: Cigarettes    Smokeless tobacco: Former     Types: Chew   Substance Use Topics    Alcohol use: Yes       History   Drug Use No             Review of  "Systems  Constitutional, neuro, ENT, endocrine, pulmonary, cardiac, gastrointestinal, genitourinary, musculoskeletal, integument and psychiatric systems are negative, except as otherwise noted.    Objective    /84 (BP Location: Right arm, Patient Position: Sitting, Cuff Size: Adult Regular)   Pulse 75   Temp 97.3  F (36.3  C) (Temporal)   Resp 18   Ht 1.859 m (6' 1.2\")   Wt 112 kg (247 lb)   SpO2 97%   BMI 32.41 kg/m     Estimated body mass index is 32.41 kg/m  as calculated from the following:    Height as of this encounter: 1.859 m (6' 1.2\").    Weight as of this encounter: 112 kg (247 lb).  Physical Exam  GENERAL: alert and no distress  EYES: Eyes grossly normal to inspection, PERRL and conjunctivae and sclerae normal  HENT: ear canals and TM's normal, nose and mouth without ulcers or lesions  NECK: no adenopathy, no asymmetry, masses, or scars  RESP: lungs clear to auscultation - no rales, rhonchi or wheezes  CV: regular rate and rhythm, normal S1 S2, no S3 or S4, no murmur, click or rub, no peripheral edema  ABDOMEN: soft, nontender, no hepatosplenomegaly, no masses and bowel sounds normal  MS: no gross musculoskeletal defects noted, no edema  SKIN: no suspicious lesions or rashes  NEURO: Normal strength and tone, mentation intact and speech normal  PSYCH: mentation appears normal, affect normal/bright    Recent Labs   Lab Test 11/01/24  1050 08/22/24  1313    143   POTASSIUM 4.3 4.4   CR 0.79 0.85        Diagnostics  BMP pending at this time.  Results will be reviewed when available. Will reach out if concerns.   No EKG required, no history of coronary heart disease, significant arrhythmia, peripheral arterial disease or other structural heart disease.    Revised Cardiac Risk Index (RCRI)  The patient has the following serious cardiovascular risks for perioperative complications:   - No serious cardiac risks = 0 points     RCRI Interpretation: 0 points: Class I (very low risk - 0.4% " complication rate)       The longitudinal plan of care for the diagnosis(es)/condition(s) as documented were addressed during this visit. Due to the added complexity in care, I will continue to support Rocael in the subsequent management and with ongoing continuity of care.    Signed Electronically by: Elsa Cohen MD  A copy of this evaluation report is provided to the requesting physician.

## 2025-05-29 NOTE — PATIENT INSTRUCTIONS
Hold losartan the morning of surgery. All other meds you can take with a small sip of water if you take in the morning.

## 2025-06-02 ENCOUNTER — MYC REFILL (OUTPATIENT)
Dept: PEDIATRICS | Facility: CLINIC | Age: 50
End: 2025-06-02
Payer: COMMERCIAL

## 2025-06-02 ENCOUNTER — TRANSFERRED RECORDS (OUTPATIENT)
Dept: HEALTH INFORMATION MANAGEMENT | Facility: CLINIC | Age: 50
End: 2025-06-02
Payer: COMMERCIAL

## 2025-06-02 ENCOUNTER — RESULTS FOLLOW-UP (OUTPATIENT)
Dept: PEDIATRICS | Facility: CLINIC | Age: 50
End: 2025-06-02

## 2025-06-02 DIAGNOSIS — M54.50 BILATERAL LOW BACK PAIN WITHOUT SCIATICA, UNSPECIFIED CHRONICITY: ICD-10-CM

## 2025-06-03 RX ORDER — METHOCARBAMOL 750 MG/1
750 TABLET, FILM COATED ORAL 3 TIMES DAILY
Qty: 15 TABLET | Refills: 0 | Status: SHIPPED | OUTPATIENT
Start: 2025-06-03

## 2025-06-09 ENCOUNTER — ALLIED HEALTH/NURSE VISIT (OUTPATIENT)
Dept: PLASTIC SURGERY | Facility: CLINIC | Age: 50
End: 2025-06-09
Payer: COMMERCIAL

## 2025-06-09 DIAGNOSIS — H02.836 DERMATOCHALASIS OF BOTH EYELIDS: Primary | ICD-10-CM

## 2025-06-09 DIAGNOSIS — H02.833 DERMATOCHALASIS OF BOTH EYELIDS: Primary | ICD-10-CM

## 2025-06-09 DIAGNOSIS — Z41.1 ENCOUNTER FOR COSMETIC PROCEDURE: ICD-10-CM

## 2025-06-11 NOTE — PROGRESS NOTES
Pt came into clinic today POD7 s/p Bilateral upper lid blepharoplasty.    Sutures were removed. Incisions appear well approximated and appropriately red and swollen.    Pt was instructed to keep a sheen of Aquaphor over the incisions for the next 2-3 weeks while incisions are still healing.     Pt will follow in 1-2 months, or sooner if he is having issues.     Marilee Hess RN  06/11/25 11:12 AM

## 2025-06-30 ENCOUNTER — MYC REFILL (OUTPATIENT)
Dept: PEDIATRICS | Facility: CLINIC | Age: 50
End: 2025-06-30
Payer: COMMERCIAL

## 2025-06-30 DIAGNOSIS — E78.5 HYPERLIPIDEMIA LDL GOAL <160: ICD-10-CM

## 2025-06-30 DIAGNOSIS — I10 PRIMARY HYPERTENSION: ICD-10-CM

## 2025-06-30 DIAGNOSIS — M54.50 BILATERAL LOW BACK PAIN WITHOUT SCIATICA, UNSPECIFIED CHRONICITY: ICD-10-CM

## 2025-06-30 RX ORDER — METHOCARBAMOL 750 MG/1
750 TABLET, FILM COATED ORAL 3 TIMES DAILY
Qty: 30 TABLET | Refills: 0 | Status: SHIPPED | OUTPATIENT
Start: 2025-06-30

## 2025-06-30 RX ORDER — LOSARTAN POTASSIUM 100 MG/1
100 TABLET ORAL DAILY
Qty: 90 TABLET | Refills: 0 | Status: SHIPPED | OUTPATIENT
Start: 2025-06-30

## 2025-06-30 RX ORDER — EZETIMIBE 10 MG/1
10 TABLET ORAL DAILY
Qty: 90 TABLET | Refills: 1 | OUTPATIENT
Start: 2025-06-30

## 2025-07-18 SDOH — HEALTH STABILITY: PHYSICAL HEALTH: ON AVERAGE, HOW MANY MINUTES DO YOU ENGAGE IN EXERCISE AT THIS LEVEL?: 30 MIN

## 2025-07-18 SDOH — HEALTH STABILITY: PHYSICAL HEALTH: ON AVERAGE, HOW MANY DAYS PER WEEK DO YOU ENGAGE IN MODERATE TO STRENUOUS EXERCISE (LIKE A BRISK WALK)?: 4 DAYS

## 2025-07-18 ASSESSMENT — SOCIAL DETERMINANTS OF HEALTH (SDOH): HOW OFTEN DO YOU GET TOGETHER WITH FRIENDS OR RELATIVES?: ONCE A WEEK

## 2025-07-23 ENCOUNTER — OFFICE VISIT (OUTPATIENT)
Dept: PEDIATRICS | Facility: CLINIC | Age: 50
End: 2025-07-23
Payer: COMMERCIAL

## 2025-07-23 VITALS
DIASTOLIC BLOOD PRESSURE: 80 MMHG | SYSTOLIC BLOOD PRESSURE: 126 MMHG | OXYGEN SATURATION: 97 % | HEIGHT: 73 IN | HEART RATE: 88 BPM | WEIGHT: 235 LBS | RESPIRATION RATE: 18 BRPM | BODY MASS INDEX: 31.14 KG/M2 | TEMPERATURE: 98.2 F

## 2025-07-23 DIAGNOSIS — E78.5 HYPERLIPIDEMIA LDL GOAL <160: ICD-10-CM

## 2025-07-23 DIAGNOSIS — Z12.5 ENCOUNTER FOR SCREENING PROSTATE SPECIFIC ANTIGEN (PSA) MEASUREMENT: ICD-10-CM

## 2025-07-23 DIAGNOSIS — K76.0 FATTY LIVER: ICD-10-CM

## 2025-07-23 DIAGNOSIS — R79.89 ELEVATED LFTS: ICD-10-CM

## 2025-07-23 DIAGNOSIS — Z87.442 HISTORY OF KIDNEY STONES: ICD-10-CM

## 2025-07-23 DIAGNOSIS — Z00.00 ROUTINE GENERAL MEDICAL EXAMINATION AT A HEALTH CARE FACILITY: Primary | ICD-10-CM

## 2025-07-23 DIAGNOSIS — N20.0 RECURRENT KIDNEY STONES: ICD-10-CM

## 2025-07-23 DIAGNOSIS — L91.8 SKIN TAG: ICD-10-CM

## 2025-07-23 DIAGNOSIS — M54.50 BILATERAL LOW BACK PAIN WITHOUT SCIATICA, UNSPECIFIED CHRONICITY: ICD-10-CM

## 2025-07-23 DIAGNOSIS — I10 BENIGN ESSENTIAL HYPERTENSION: ICD-10-CM

## 2025-07-23 PROCEDURE — 99396 PREV VISIT EST AGE 40-64: CPT | Performed by: NURSE PRACTITIONER

## 2025-07-23 PROCEDURE — G0103 PSA SCREENING: HCPCS | Performed by: NURSE PRACTITIONER

## 2025-07-23 PROCEDURE — 80061 LIPID PANEL: CPT | Performed by: NURSE PRACTITIONER

## 2025-07-23 PROCEDURE — G2211 COMPLEX E/M VISIT ADD ON: HCPCS | Performed by: NURSE PRACTITIONER

## 2025-07-23 PROCEDURE — 36415 COLL VENOUS BLD VENIPUNCTURE: CPT | Performed by: NURSE PRACTITIONER

## 2025-07-23 PROCEDURE — 99214 OFFICE O/P EST MOD 30 MIN: CPT | Mod: 25 | Performed by: NURSE PRACTITIONER

## 2025-07-23 PROCEDURE — 80076 HEPATIC FUNCTION PANEL: CPT | Performed by: NURSE PRACTITIONER

## 2025-07-23 RX ORDER — METHOCARBAMOL 750 MG/1
750 TABLET, FILM COATED ORAL 3 TIMES DAILY
Qty: 30 TABLET | Refills: 0 | Status: SHIPPED | OUTPATIENT
Start: 2025-07-23

## 2025-07-23 RX ORDER — TAMSULOSIN HYDROCHLORIDE 0.4 MG/1
0.4 CAPSULE ORAL DAILY
Qty: 20 CAPSULE | Refills: 0 | Status: SHIPPED | OUTPATIENT
Start: 2025-07-23

## 2025-07-23 RX ORDER — EZETIMIBE 10 MG/1
10 TABLET ORAL DAILY
Qty: 90 TABLET | Refills: 3 | Status: SHIPPED | OUTPATIENT
Start: 2025-07-23

## 2025-07-23 RX ORDER — LOSARTAN POTASSIUM 100 MG/1
100 TABLET ORAL DAILY
Qty: 90 TABLET | Refills: 3 | Status: SHIPPED | OUTPATIENT
Start: 2025-07-23

## 2025-07-23 ASSESSMENT — PAIN SCALES - GENERAL: PAINLEVEL_OUTOF10: NO PAIN (0)

## 2025-07-23 NOTE — PATIENT INSTRUCTIONS
Patient Education   Preventive Care Advice   This is general advice given by our system to help you stay healthy. However, your care team may have specific advice just for you. Please talk to your care team about your preventive care needs.  Nutrition  Eat 5 or more servings of fruits and vegetables each day.  Try wheat bread, brown rice and whole grain pasta (instead of white bread, rice, and pasta).  Get enough calcium and vitamin D. Check the label on foods and aim for 100% of the RDA (recommended daily allowance).  Lifestyle  Exercise at least 150 minutes each week  (30 minutes a day, 5 days a week).  Do muscle strengthening activities 2 days a week. These help control your weight and prevent disease.  No smoking.  Wear sunscreen to prevent skin cancer.  Have a dental exam and cleaning every 6 months.  Yearly exams  See your health care team every year to talk about:  Any changes in your health.  Any medicines your care team has prescribed.  Preventive care, family planning, and ways to prevent chronic diseases.  Shots (vaccines)   HPV shots (up to age 26), if you've never had them before.  Hepatitis B shots (up to age 59), if you've never had them before.  COVID-19 shot: Get this shot when it's due.  Flu shot: Get a flu shot every year.  Tetanus shot: Get a tetanus shot every 10 years.  Pneumococcal, hepatitis A, and RSV shots: Ask your care team if you need these based on your risk.  Shingles shot (for age 50 and up)  General health tests  Diabetes screening:  Starting at age 35, Get screened for diabetes at least every 3 years.  If you are younger than age 35, ask your care team if you should be screened for diabetes.  Cholesterol test: At age 39, start having a cholesterol test every 5 years, or more often if advised.  Bone density scan (DEXA): At age 50, ask your care team if you should have this scan for osteoporosis (brittle bones).  Hepatitis C: Get tested at least once in your life.  STIs (sexually  transmitted infections)  Before age 24: Ask your care team if you should be screened for STIs.  After age 24: Get screened for STIs if you're at risk. You are at risk for STIs (including HIV) if:  You are sexually active with more than one person.  You don't use condoms every time.  You or a partner was diagnosed with a sexually transmitted infection.  If you are at risk for HIV, ask about PrEP medicine to prevent HIV.  Get tested for HIV at least once in your life, whether you are at risk for HIV or not.  Cancer screening tests  Cervical cancer screening: If you have a cervix, begin getting regular cervical cancer screening tests starting at age 21.  Breast cancer scan (mammogram): If you've ever had breasts, begin having regular mammograms starting at age 40. This is a scan to check for breast cancer.  Colon cancer screening: It is important to start screening for colon cancer at age 45.  Have a colonoscopy test every 10 years (or more often if you're at risk) Or, ask your provider about stool tests like a FIT test every year or Cologuard test every 3 years.  To learn more about your testing options, visit:   .  For help making a decision, visit:   https://bit.ly/tg83973.  Prostate cancer screening test: If you have a prostate, ask your care team if a prostate cancer screening test (PSA) at age 55 is right for you.  Lung cancer screening: If you are a current or former smoker ages 50 to 80, ask your care team if ongoing lung cancer screenings are right for you.  For informational purposes only. Not to replace the advice of your health care provider. Copyright   2023 [employer]. All rights reserved. Clinically reviewed by the [facility] Transitions Program. [employer] [id number] - REV 01/24.

## 2025-07-23 NOTE — PROGRESS NOTES
Preventive Care Visit  [facility] [place] Farris NP, Family Medicine  Jul 23, 2025      Assessment & Plan     Routine general medical examination at a health care facility  Routine exam performed today. Age appropriate screening and preventative care have been addressed today.   Vaccinations are up to date. Recommend annual vision exams as well as biannual dental exams. They will follow up for annual physical again in one year.       Benign essential hypertension  Stable. Continue current medication.  - losartan (COZAAR) 100 MG tablet; Take 1 tablet (100 mg) by mouth daily.  - Hepatic panel (Albumin, ALT, AST, Bili, Alk Phos, TP)    Fatty liver  Elevated LFTs  Improved with dietary modification and weight loss.  Labs updated today  - Hepatic panel (Albumin, ALT, AST, Bili, Alk Phos, TP)    Hyperlipidemia LDL goal <160  Improved. Continue with Zetia.  Labs updated today  - Lipid panel reflex to direct LDL Fasting  - ezetimibe (ZETIA) 10 MG tablet; Take 1 tablet (10 mg) by mouth daily.    History of kidney stones  Recurrent kidney stones  Continue to use as needed.  Encouraged diet modification  - tamsulosin (FLOMAX) 0.4 MG capsule; Take 1 capsule (0.4 mg) by mouth daily. At onset of flank pain for kidney stone    Bilateral low back pain without sciatica, unspecified chronicity  Continue to use as needed.  - methocarbamol (ROBAXIN) 750 MG tablet; Take 1 tablet (750 mg) by mouth 3 times daily.    Skin tag  - Adult Dermatology  Referral; Future    Encounter for screening prostate specific antigen (PSA) measurement  - PSA, screen      Nicotine/Tobacco Cessation  He reports that he has been smoking cigarettes. He has quit using smokeless tobacco.  His smokeless tobacco use included chew.  Nicotine/Tobacco Cessation Plan  Information offered: Patient not interested at this time      BMI  Estimated body mass index is 31 kg/m  as calculated from the following:    Height as of this encounter:  "1.854 m (6' 1\").    Weight as of this encounter: 106.6 kg (235 lb).   Weight management plan: Discussed healthy diet and exercise guidelines    Counseling  Appropriate preventive services were addressed with this patient via screening, questionnaire, or discussion as appropriate for fall prevention, nutrition, physical activity, Tobacco-use cessation, social engagement, weight loss and cognition.  Checklist reviewing preventive services available has been given to the patient.  Reviewed patient's diet, addressing concerns and/or questions.       The longitudinal plan of care for the diagnosis(es)/condition(s) as documented were addressed during this visit. Due to the added complexity in care, I will continue to support Rocael in the subsequent management and with ongoing continuity of care.      Subjective   Rocael is a 50 year old, presenting for the following:    Physical        7/23/2025     1:06 PM   Additional Questions   Roomed by Telma SUGGS CMA   Accompanied by self         7/23/2025     1:06 PM   Patient Reported Additional Medications   Patient reports taking the following new medications n/a          HPI    Advance Care Planning      Discussed advance care planning with patient; informed AVS has link to [facility].        7/18/2025   General Health   How would you rate your overall physical health? Good   Feel stress (tense, anxious, or unable to sleep) Not at all         7/18/2025   Nutrition   Three or more servings of calcium each day? Yes   Diet: Regular (no restrictions)   How many servings of fruit and vegetables per day? (!) 0-1   How many sweetened beverages each day? 0-1      Balanced    Wt Readings from Last 4 Encounters:   07/23/25 106.6 kg (235 lb)   05/29/25 112 kg (247 lb)   08/30/24 114.1 kg (251 lb 9.6 oz)   05/22/24 112 kg (246 lb 14.4 oz)            7/18/2025   Exercise   Days per week of moderate/strenous exercise 4 days   Average minutes spent exercising at this level 30 min "      Walking 30m daily.        2025   Social Factors   Frequency of gathering with friends or relatives Once a week   Worry food won't last until get money to buy more No   Food not last or not have enough money for food? No   Do you have housing? (Housing is defined as stable permanent housing and does not include staying outside in a car, in a tent, in an abandoned building, in an overnight shelter, or couch-surfing.) Yes   Are you worried about losing your housing? No   Lack of transportation? No   Unable to get utilities (heat,electricity)? No           2025   Fall Risk   Fallen 2 or more times in the past year? No   Trouble with walking or balance? No          2025   Dental   Dentist two times every year? Yes       Today's PHQ-2 Score:       2025    12:35 PM   PHQ-2 (  [employer])   Q1: Little interest or pleasure in doing things 0   Q2: Feeling down, depressed or hopeless 0   PHQ-2 Score 0    Q1: Little interest or pleasure in doing things Not at all   Q2: Feeling down, depressed or hopeless Not at all   PHQ-2 Score 0       Patient-reported           2025   Substance Use   Alcohol more than 3/day or more than 7/wk No   Do you use any other substances recreationally? No     Social History     Tobacco Use    Smoking status: Some Days     Types: Cigarettes    Smokeless tobacco: Former     Types: Chew   Vaping Use    Vaping status: Never Used   Substance Use Topics    Alcohol use: Yes    Drug use: No     Cigarette on weekends  Alcohol use social        2025   STI Screening   New sexual partner(s) since last STI/HIV test? No     [facility] cancer screen[facility]25 [facility].  Follow-up 3 years.    High cholesterol:  -Improved.  -Takes ezetimibe 10mg daily  -No side effects.  LDL Cholesterol Calculated   Date Value Ref Range Status   2024 95 <=100 mg/dL Final   2020 129 (H) <100 mg/dL Final     Comment:     Above desirable:  100-129 mg/dl  Borderline High:  130-159  "mg/dL  High:             160-189 mg/dL  Very high:       >189 mg/dl        ASCVD Risk   The 10-year ASCVD risk score (Jacobo RON, et al., 2019) is: 6.5%    Values used to calculate the score:      Age: 50 years      Sex: Male      Is Non- : No      Diabetic: No      Tobacco smoker: Yes      Systolic Blood Pressure: 126 mmHg      Is BP treated: Yes      HDL Cholesterol: 71 mg/dL      Total Cholesterol: 200 mg/dL      Kidney stones:  -Frequent history.  Last stone 2 weeks ago.  -Does have Flomax to take when he feels like he has one.  -Does not follow calcium oxalate diet.     Elevated LFT:  -Persistent elevated LFT  -Hepatitis screening negative  -US showed fatty liver 9/2020  -Did follow-up with [facility] and recommended Fibro-Scan but patient declined.  -Has lost weight and changed diet    Lower back pain.  Recurrent.  Uses methocarbamol as needed.   Did receive cortisone injection 8/2024 which did help.      Smoking:  -No daily smoking  -Social smoking    Reviewed and updated as needed this visit by Provider                  Review of Systems  Constitutional, HEENT, cardiovascular, pulmonary, gi and gu systems are negative, except as otherwise noted.     Objective    Exam  /80 (BP Location: Right arm, Patient Position: Sitting)   Pulse 88   Temp 98.2  F (36.8  C) (Temporal)   Resp 18   Ht 1.854 m (6' 1\")   Wt 106.6 kg (235 lb)   SpO2 97%   BMI 31.00 kg/m     Estimated body mass index is 31 kg/m  as calculated from the following:    Height as of this encounter: 1.854 m (6' 1\").    Weight as of this encounter: 106.6 kg (235 lb).      Physical Exam  GENERAL: alert and no distress  EYES: Eyes grossly normal to inspection, PERRL and conjunctivae and sclerae normal  HENT: ear canals and TM's normal, nose and mouth without ulcers or lesions  NECK: no adenopathy, no asymmetry, masses, or scars  RESP: lungs clear to auscultation - no rales, rhonchi or wheezes  CV: regular rate and " rhythm, normal S1 S2, no S3 or S4, no murmur, click or rub, no peripheral edema  ABDOMEN: soft, nontender, no hepatosplenomegaly, no masses and bowel sounds normal  MS: no gross musculoskeletal defects noted, no edema  SKIN: no suspicious lesions or rashes   1 large and 2 small skin tags located on left upper thigh  NEURO: Normal strength and tone, mentation intact and speech normal  PSYCH: mentation appears normal, affect normal/bright        Signed Electronically by: Nhung Farris NP

## 2025-07-24 ENCOUNTER — PATIENT OUTREACH (OUTPATIENT)
Dept: CARE COORDINATION | Facility: CLINIC | Age: 50
End: 2025-07-24
Payer: COMMERCIAL

## 2025-07-24 LAB
ALBUMIN SERPL BCG-MCNC: 4.6 G/DL (ref 3.5–5.2)
ALP SERPL-CCNC: 85 U/L (ref 40–150)
ALT SERPL W P-5'-P-CCNC: 150 U/L (ref 0–70)
AST SERPL W P-5'-P-CCNC: 133 U/L (ref 0–45)
BILIRUB SERPL-MCNC: 2.1 MG/DL
BILIRUBIN DIRECT (ROCHE PRO & PURE): 0.71 MG/DL (ref 0–0.45)
CHOLEST SERPL-MCNC: 237 MG/DL
FASTING STATUS PATIENT QL REPORTED: YES
HDLC SERPL-MCNC: 69 MG/DL
LDLC SERPL CALC-MCNC: 134 MG/DL
NONHDLC SERPL-MCNC: 168 MG/DL
PROT SERPL-MCNC: 7.5 G/DL (ref 6.4–8.3)
PSA SERPL DL<=0.01 NG/ML-MCNC: 2.06 NG/ML (ref 0–3.5)
TRIGL SERPL-MCNC: 168 MG/DL

## 2025-07-28 DIAGNOSIS — R79.89 ABNORMAL LFTS: Primary | ICD-10-CM

## 2025-07-28 DIAGNOSIS — K76.0 FATTY LIVER: ICD-10-CM

## 2025-07-28 PROBLEM — D12.6 ADENOMATOUS POLYP OF COLON: Status: ACTIVE | Noted: 2025-07-28

## 2025-07-29 ENCOUNTER — PATIENT OUTREACH (OUTPATIENT)
Dept: GASTROENTEROLOGY | Facility: CLINIC | Age: 50
End: 2025-07-29
Payer: COMMERCIAL

## 2025-07-29 ENCOUNTER — OFFICE VISIT (OUTPATIENT)
Dept: PLASTIC SURGERY | Facility: CLINIC | Age: 50
End: 2025-07-29
Payer: COMMERCIAL

## 2025-07-29 DIAGNOSIS — H02.834 DERMATOCHALASIS OF BOTH UPPER EYELIDS: ICD-10-CM

## 2025-07-29 DIAGNOSIS — Z41.1 ENCOUNTER FOR COSMETIC PROCEDURE: Primary | ICD-10-CM

## 2025-07-29 DIAGNOSIS — H02.831 DERMATOCHALASIS OF BOTH UPPER EYELIDS: ICD-10-CM

## 2025-07-31 NOTE — PROGRESS NOTES
Pt came into clinic today 1 month PO s/p Bilateral upper eyelid blepharoplasty - ins (6/2/25).    Upon exam, pt I healing really nicely. His is feeling happy that he is able to see better out of his peripheral vision.    Will work to obtain PA for in office scar revision.     Marilee Hess RN  07/31/25 3:52 PM

## 2025-08-11 ENCOUNTER — PRE VISIT (OUTPATIENT)
Dept: GASTROENTEROLOGY | Facility: CLINIC | Age: 50
End: 2025-08-11
Payer: COMMERCIAL

## 2025-08-19 ENCOUNTER — MYC REFILL (OUTPATIENT)
Dept: PEDIATRICS | Facility: CLINIC | Age: 50
End: 2025-08-19
Payer: COMMERCIAL

## 2025-08-19 DIAGNOSIS — M54.50 BILATERAL LOW BACK PAIN WITHOUT SCIATICA, UNSPECIFIED CHRONICITY: ICD-10-CM

## 2025-08-19 RX ORDER — METHOCARBAMOL 750 MG/1
750 TABLET, FILM COATED ORAL 3 TIMES DAILY
Qty: 30 TABLET | Refills: 0 | Status: SHIPPED | OUTPATIENT
Start: 2025-08-19

## (undated) DEVICE — KIT ENDO TURNOVER/PROCEDURE W/CLEAN A SCOPE LINERS 103888

## (undated) RX ORDER — LIDOCAINE HYDROCHLORIDE 10 MG/ML
INJECTION, SOLUTION EPIDURAL; INFILTRATION; INTRACAUDAL; PERINEURAL
Status: DISPENSED
Start: 2024-08-26

## (undated) RX ORDER — FENTANYL CITRATE 50 UG/ML
INJECTION, SOLUTION INTRAMUSCULAR; INTRAVENOUS
Status: DISPENSED
Start: 2023-06-01

## (undated) RX ORDER — DEXAMETHASONE SODIUM PHOSPHATE 10 MG/ML
INJECTION, SOLUTION INTRAMUSCULAR; INTRAVENOUS
Status: DISPENSED
Start: 2024-08-26

## (undated) RX ORDER — BETAMETHASONE SODIUM PHOSPHATE AND BETAMETHASONE ACETATE 3; 3 MG/ML; MG/ML
INJECTION, SUSPENSION INTRA-ARTICULAR; INTRALESIONAL; INTRAMUSCULAR; SOFT TISSUE
Status: DISPENSED
Start: 2024-08-26